# Patient Record
Sex: MALE | Race: WHITE | Employment: FULL TIME | URBAN - METROPOLITAN AREA
[De-identification: names, ages, dates, MRNs, and addresses within clinical notes are randomized per-mention and may not be internally consistent; named-entity substitution may affect disease eponyms.]

---

## 2017-12-06 ENCOUNTER — HOSPITAL ENCOUNTER (EMERGENCY)
Age: 57
Discharge: HOME | End: 2017-12-06
Payer: COMMERCIAL

## 2017-12-06 VITALS
OXYGEN SATURATION: 100 % | HEIGHT: 67 IN | WEIGHT: 195 LBS | BODY MASS INDEX: 30.61 KG/M2 | SYSTOLIC BLOOD PRESSURE: 124 MMHG | RESPIRATION RATE: 18 BRPM | HEART RATE: 68 BPM | DIASTOLIC BLOOD PRESSURE: 68 MMHG | TEMPERATURE: 97.8 F

## 2017-12-06 DIAGNOSIS — F12.90 MARIJUANA USE: Primary | ICD-10-CM

## 2017-12-06 PROCEDURE — 99283 EMERGENCY DEPT VISIT LOW MDM: CPT

## 2017-12-06 PROCEDURE — 96372 THER/PROPH/DIAG INJ SC/IM: CPT

## 2017-12-06 PROCEDURE — 96374 THER/PROPH/DIAG INJ IV PUSH: CPT

## 2017-12-06 PROCEDURE — 6360000002 HC RX W HCPCS: Performed by: PERSONAL EMERGENCY RESPONSE ATTENDANT

## 2017-12-06 RX ORDER — PROMETHAZINE HYDROCHLORIDE 25 MG/ML
25 INJECTION, SOLUTION INTRAMUSCULAR; INTRAVENOUS ONCE
Status: COMPLETED | OUTPATIENT
Start: 2017-12-06 | End: 2017-12-06

## 2017-12-06 RX ORDER — ONDANSETRON 2 MG/ML
4 INJECTION INTRAMUSCULAR; INTRAVENOUS ONCE
Status: COMPLETED | OUTPATIENT
Start: 2017-12-06 | End: 2017-12-06

## 2017-12-06 RX ADMIN — ONDANSETRON 4 MG: 2 INJECTION INTRAMUSCULAR; INTRAVENOUS at 19:50

## 2017-12-06 RX ADMIN — PROMETHAZINE HYDROCHLORIDE 25 MG: 25 INJECTION, SOLUTION INTRAMUSCULAR; INTRAVENOUS at 21:18

## 2017-12-06 ASSESSMENT — ENCOUNTER SYMPTOMS
DIARRHEA: 0
COLOR CHANGE: 0
BLOOD IN STOOL: 0
NAUSEA: 1
RHINORRHEA: 0
ABDOMINAL PAIN: 0
VOMITING: 1
SHORTNESS OF BREATH: 0
COUGH: 0

## 2017-12-07 NOTE — ED PROVIDER NOTES
[address] [facility] ED  eMERGENCY dEPARTMENT eNCOUnter      Pt Name: Kelly Strange  MRN: [id number]  [place] 1960  Date of evaluation: 12/6/2017  Provider: NUVIA Cantor      HISTORY OF PRESENT ILLNESS    Kelly Strange is a 59 y.o. male presents to the emergency department with nausea and vomiting after marijuana edible ingestion. Patient states at 5:30 PM today he was offered a marijuana brownie. He has smoked marijuana in the past but has never done an edible. He states he ate the whole thing and then started to feel nauseous and vomited 4 times. He does feel high in the room. He denies fevers, chest pain, short of breath, diarrhea, urinary complaints. Does have generalized weakness. She complains of being tired in the room. HPI    Nursing Notes were reviewed. REVIEW OF SYSTEMS       Review of Systems   Constitutional: Negative for appetite change and fever. HENT: Negative for congestion, drooling and rhinorrhea. Respiratory: Negative for cough and shortness of breath. Cardiovascular: Negative for chest pain. Gastrointestinal: Positive for nausea and vomiting. Negative for abdominal pain, blood in stool and diarrhea. Genitourinary: Negative for difficulty urinating. Musculoskeletal: Negative for neck stiffness. Skin: Negative for color change and rash. Neurological: Negative for dizziness, syncope, light-headedness, numbness and headaches. PAST MEDICAL HISTORY   History reviewed. No pertinent past medical history. SURGICAL HISTORY     History reviewed. No pertinent surgical history. CURRENT MEDICATIONS       Previous Medications    No medications on file       ALLERGIES     Review of patient's allergies indicates no known allergies. FAMILY HISTORY     History reviewed. No pertinent family history.        SOCIAL HISTORY       Social History     Social History    Marital status:      Spouse name: N/A    Number of children: N/A    Years of by the emergency physician with the below findings:    Interpretation per the Radiologist below, if available at the time of this note:    No orders to display           LABS:  Labs Reviewed - No data to display    All other labs were within normal range or not returned as of this dictation. EMERGENCY DEPARTMENT COURSE and DIFFERENTIAL DIAGNOSIS/MDM:   Vitals:    Vitals:    12/06/17 1925 12/06/17 1926   BP:  (!) 161/88   Pulse:  80   Resp:  20   Temp:  98.1 °F (36.7 °C)   SpO2:  97%   Weight: 195 lb (88.5 kg)    Height: 5' 7\" (1.702 m)          MDM    Patient presents with vomiting after marijuana brownie ingestion. Vital signs are not concerning at this time. There is no evidence of hypovolemia. I do not feel blood work at this time is needed. Patient is laying on reassessment comfortably in the cart no longer with nausea or emesis. Vital signs are not concerning. Daughter is at bedside. Standard anticipatory guidance given to patient upon discharge. Have given them a specific time frame in which to follow-up and who to follow-up with. I have also advised them that they should return to the emergency department if they get worse, or not getting better or develop any new or concerning symptoms. Patient demonstrates understanding. CRITICAL CARE TIME   Total Critical Care time was 0 minutes, excluding separately reportable procedures. There was a high probability of clinically significant/life threatening deterioration in the patient's condition which required my urgent intervention. Procedures    FINAL IMPRESSION      1.  Marijuana use          DISPOSITION/PLAN   DISPOSITION Discharge - Pending Orders Complete    PATIENT REFERRED TO:  [address] [place] [address]  [phone]  In 2 days        DISCHARGE MEDICATIONS:  New Prescriptions    No medications on file          (Please note that portions of this note were completed with a voice recognition program. Efforts were made to edit the dictations but occasionally words are mis-transcribed. )    NUVIA Hooper (electronically signed)  Emergency Physician [address] [place] [address]  12/06/17 8596

## 2017-12-07 NOTE — ED NOTES
While patient being discharge patient had x1 episode of emesis. Indra PEDERSEN notified.       Rosendo Ndiaye RN  12/06/17 [id number]

## 2022-04-04 ENCOUNTER — HOSPITAL ENCOUNTER (INPATIENT)
Age: 62
LOS: 3 days | Discharge: HOME | DRG: 885 | End: 2022-04-07
Attending: STUDENT IN AN ORGANIZED HEALTH CARE EDUCATION/TRAINING PROGRAM | Admitting: PSYCHIATRY & NEUROLOGY
Payer: COMMERCIAL

## 2022-04-04 DIAGNOSIS — R45.851 SUICIDAL IDEATION: Primary | ICD-10-CM

## 2022-04-04 PROBLEM — F32.2 MAJOR DEPRESSIVE DISORDER, SEVERE (HCC): Status: ACTIVE | Noted: 2022-04-04

## 2022-04-04 LAB
ACETAMINOPHEN LEVEL: <5 UG/ML (ref 10–30)
ALBUMIN SERPL-MCNC: 4.3 G/DL (ref 3.5–4.6)
ALP BLD-CCNC: 70 U/L (ref 35–104)
ALT SERPL-CCNC: 18 U/L (ref 0–41)
AMPHETAMINE SCREEN, URINE: ABNORMAL
ANION GAP SERPL CALCULATED.3IONS-SCNC: 10 MEQ/L (ref 9–15)
AST SERPL-CCNC: 20 U/L (ref 0–40)
BARBITURATE SCREEN URINE: ABNORMAL
BASOPHILS ABSOLUTE: 0.1 K/UL (ref 0–0.2)
BASOPHILS RELATIVE PERCENT: 0.6 %
BENZODIAZEPINE SCREEN, URINE: ABNORMAL
BILIRUB SERPL-MCNC: 0.5 MG/DL (ref 0.2–0.7)
BILIRUBIN URINE: NEGATIVE
BLOOD, URINE: NEGATIVE
BUN BLDV-MCNC: 19 MG/DL (ref 8–23)
CALCIUM SERPL-MCNC: 8.8 MG/DL (ref 8.5–9.9)
CANNABINOID SCREEN URINE: POSITIVE
CHLORIDE BLD-SCNC: 103 MEQ/L (ref 95–107)
CHOLESTEROL, TOTAL: 204 MG/DL (ref 0–199)
CLARITY: CLEAR
CO2: 24 MEQ/L (ref 20–31)
COCAINE METABOLITE SCREEN URINE: ABNORMAL
COLOR: YELLOW
CREAT SERPL-MCNC: 0.91 MG/DL (ref 0.7–1.2)
EOSINOPHILS ABSOLUTE: 0.1 K/UL (ref 0–0.7)
EOSINOPHILS RELATIVE PERCENT: 1.3 %
ETHANOL PERCENT: NORMAL G/DL
ETHANOL: <10 MG/DL (ref 0–0.08)
GFR AFRICAN AMERICAN: >60
GFR NON-AFRICAN AMERICAN: >60
GLOBULIN: 3.1 G/DL (ref 2.3–3.5)
GLUCOSE BLD-MCNC: 110 MG/DL (ref 70–99)
GLUCOSE URINE: NEGATIVE MG/DL
HCT VFR BLD CALC: 43.7 % (ref 42–52)
HDLC SERPL-MCNC: 60 MG/DL (ref 40–59)
HEMOGLOBIN: 14.6 G/DL (ref 14–18)
KETONES, URINE: NEGATIVE MG/DL
LDL CHOLESTEROL CALCULATED: 115 MG/DL (ref 0–129)
LEUKOCYTE ESTERASE, URINE: NEGATIVE
LYMPHOCYTES ABSOLUTE: 1.8 K/UL (ref 1–4.8)
LYMPHOCYTES RELATIVE PERCENT: 21 %
Lab: ABNORMAL
MCH RBC QN AUTO: 30 PG (ref 27–31.3)
MCHC RBC AUTO-ENTMCNC: 33.3 % (ref 33–37)
MCV RBC AUTO: 89.9 FL (ref 80–100)
METHADONE SCREEN, URINE: ABNORMAL
MONOCYTES ABSOLUTE: 0.9 K/UL (ref 0.2–0.8)
MONOCYTES RELATIVE PERCENT: 10.9 %
NEUTROPHILS ABSOLUTE: 5.6 K/UL (ref 1.4–6.5)
NEUTROPHILS RELATIVE PERCENT: 66.2 %
NITRITE, URINE: NEGATIVE
OPIATE SCREEN URINE: ABNORMAL
OXYCODONE URINE: ABNORMAL
PDW BLD-RTO: 14.5 % (ref 11.5–14.5)
PH UA: 6.5 (ref 5–9)
PHENCYCLIDINE SCREEN URINE: ABNORMAL
PLATELET # BLD: 214 K/UL (ref 130–400)
POTASSIUM SERPL-SCNC: 4 MEQ/L (ref 3.4–4.9)
PROPOXYPHENE SCREEN: ABNORMAL
PROTEIN UA: NEGATIVE MG/DL
RBC # BLD: 4.86 M/UL (ref 4.7–6.1)
SALICYLATE, SERUM: <0.3 MG/DL (ref 15–30)
SARS-COV-2, NAAT: NOT DETECTED
SODIUM BLD-SCNC: 137 MEQ/L (ref 135–144)
SPECIFIC GRAVITY UA: 1.02 (ref 1–1.03)
TOTAL CK: 208 U/L (ref 0–190)
TOTAL PROTEIN: 7.4 G/DL (ref 6.3–8)
TRIGL SERPL-MCNC: 147 MG/DL (ref 0–150)
TSH SERPL DL<=0.05 MIU/L-ACNC: 4.41 UIU/ML (ref 0.44–3.86)
URINE REFLEX TO CULTURE: NORMAL
UROBILINOGEN, URINE: 0.2 E.U./DL
WBC # BLD: 8.5 K/UL (ref 4.8–10.8)

## 2022-04-04 PROCEDURE — 87635 SARS-COV-2 COVID-19 AMP PRB: CPT

## 2022-04-04 PROCEDURE — 81003 URINALYSIS AUTO W/O SCOPE: CPT

## 2022-04-04 PROCEDURE — 6370000000 HC RX 637 (ALT 250 FOR IP): Performed by: STUDENT IN AN ORGANIZED HEALTH CARE EDUCATION/TRAINING PROGRAM

## 2022-04-04 PROCEDURE — 80307 DRUG TEST PRSMV CHEM ANLYZR: CPT

## 2022-04-04 PROCEDURE — 80143 DRUG ASSAY ACETAMINOPHEN: CPT

## 2022-04-04 PROCEDURE — 1240000000 HC EMOTIONAL WELLNESS R&B

## 2022-04-04 PROCEDURE — 6360000002 HC RX W HCPCS: Performed by: STUDENT IN AN ORGANIZED HEALTH CARE EDUCATION/TRAINING PROGRAM

## 2022-04-04 PROCEDURE — 82077 ASSAY SPEC XCP UR&BREATH IA: CPT

## 2022-04-04 PROCEDURE — 99285 EMERGENCY DEPT VISIT HI MDM: CPT

## 2022-04-04 PROCEDURE — 80053 COMPREHEN METABOLIC PANEL: CPT

## 2022-04-04 PROCEDURE — 85025 COMPLETE CBC W/AUTO DIFF WBC: CPT

## 2022-04-04 PROCEDURE — 80061 LIPID PANEL: CPT

## 2022-04-04 PROCEDURE — 84443 ASSAY THYROID STIM HORMONE: CPT

## 2022-04-04 PROCEDURE — 96372 THER/PROPH/DIAG INJ SC/IM: CPT

## 2022-04-04 PROCEDURE — 82550 ASSAY OF CK (CPK): CPT

## 2022-04-04 PROCEDURE — 36415 COLL VENOUS BLD VENIPUNCTURE: CPT

## 2022-04-04 PROCEDURE — 80179 DRUG ASSAY SALICYLATE: CPT

## 2022-04-04 RX ORDER — HYDROXYZINE PAMOATE 50 MG/1
50 CAPSULE ORAL EVERY 6 HOURS PRN
Status: DISCONTINUED | OUTPATIENT
Start: 2022-04-04 | End: 2022-04-07 | Stop reason: HOSPADM

## 2022-04-04 RX ORDER — BENZTROPINE MESYLATE 1 MG/ML
2 INJECTION INTRAMUSCULAR; INTRAVENOUS 2 TIMES DAILY PRN
Status: DISCONTINUED | OUTPATIENT
Start: 2022-04-04 | End: 2022-04-07 | Stop reason: HOSPADM

## 2022-04-04 RX ORDER — HYDROXYZINE HYDROCHLORIDE 50 MG/ML
50 INJECTION, SOLUTION INTRAMUSCULAR EVERY 6 HOURS PRN
Status: DISCONTINUED | OUTPATIENT
Start: 2022-04-04 | End: 2022-04-07 | Stop reason: HOSPADM

## 2022-04-04 RX ORDER — ACETAMINOPHEN 325 MG/1
650 TABLET ORAL EVERY 4 HOURS PRN
Status: DISCONTINUED | OUTPATIENT
Start: 2022-04-04 | End: 2022-04-07 | Stop reason: HOSPADM

## 2022-04-04 RX ORDER — HYDRALAZINE HYDROCHLORIDE 20 MG/ML
10 INJECTION INTRAMUSCULAR; INTRAVENOUS ONCE
Status: COMPLETED | OUTPATIENT
Start: 2022-04-04 | End: 2022-04-04

## 2022-04-04 RX ORDER — HALOPERIDOL 5 MG/ML
5 INJECTION INTRAMUSCULAR EVERY 6 HOURS PRN
Status: DISCONTINUED | OUTPATIENT
Start: 2022-04-04 | End: 2022-04-07 | Stop reason: HOSPADM

## 2022-04-04 RX ORDER — MAGNESIUM HYDROXIDE/ALUMINUM HYDROXICE/SIMETHICONE 120; 1200; 1200 MG/30ML; MG/30ML; MG/30ML
30 SUSPENSION ORAL PRN
Status: DISCONTINUED | OUTPATIENT
Start: 2022-04-04 | End: 2022-04-07 | Stop reason: HOSPADM

## 2022-04-04 RX ORDER — HALOPERIDOL 5 MG
5 TABLET ORAL EVERY 6 HOURS PRN
Status: DISCONTINUED | OUTPATIENT
Start: 2022-04-04 | End: 2022-04-07 | Stop reason: HOSPADM

## 2022-04-04 RX ORDER — AMLODIPINE BESYLATE 5 MG/1
5 TABLET ORAL ONCE
Status: COMPLETED | OUTPATIENT
Start: 2022-04-04 | End: 2022-04-04

## 2022-04-04 RX ORDER — TRAZODONE HYDROCHLORIDE 50 MG/1
50 TABLET ORAL NIGHTLY PRN
Status: DISCONTINUED | OUTPATIENT
Start: 2022-04-04 | End: 2022-04-07 | Stop reason: HOSPADM

## 2022-04-04 RX ADMIN — HYDRALAZINE HYDROCHLORIDE 10 MG: 20 INJECTION INTRAMUSCULAR; INTRAVENOUS at 15:52

## 2022-04-04 RX ADMIN — AMLODIPINE BESYLATE 5 MG: 5 TABLET ORAL at 14:42

## 2022-04-04 ASSESSMENT — SLEEP AND FATIGUE QUESTIONNAIRES
RESTFUL SLEEP: NO
DIFFICULTY FALLING ASLEEP: YES
SLEEP PATTERN: DIFFICULTY FALLING ASLEEP;RESTLESSNESS;DISTURBED/INTERRUPTED SLEEP;INSOMNIA
DIFFICULTY STAYING ASLEEP: YES
DIFFICULTY ARISING: YES
DO YOU USE A SLEEP AID: NO
DO YOU HAVE DIFFICULTY SLEEPING: YES

## 2022-04-04 ASSESSMENT — PATIENT HEALTH QUESTIONNAIRE - PHQ9: SUM OF ALL RESPONSES TO PHQ QUESTIONS 1-9: 5

## 2022-04-04 ASSESSMENT — ENCOUNTER SYMPTOMS
SHORTNESS OF BREATH: 0
BACK PAIN: 0
SORE THROAT: 0
CHEST TIGHTNESS: 0
EYE PAIN: 0
NAUSEA: 0
DIARRHEA: 0
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
## 2022-04-04 NOTE — ED NOTES
Awaiting call from [address] [place] [address] for bed assignment and report. Brayan Lopez  04/04/22 [id number]

## 2022-04-04 NOTE — PROGRESS NOTES
Pt admission completed in pt room. Pt states that his biggest cause of depression is that he is going through a divorce with his wife. Pt reports that his daughter, who has been on the unit before is bipolar and states that she is also a cause of stress in his life due to the chaos she causes within his family. Pt states he drinks and smokes marijuana to calm down and escape the chaos of his life. Pt states, \"I feel empty. I have no focus. And earlier I was thinking I mine as well be dead because I am dead inside. \" Pt reports he has about 20 alcoholic beverages/week. Pt states he has been sleeping on his sister's couch since his separation. Pt has a family history of SI. Pt's grandfather  by suicide and pt's mother attempted suicide. Pt rates depression 2/10 and anxiety 0/10. Pt denies current SI, HI, AVH. Pt does state \"I was smoking a joint the other day in the dark and when I turned my head really fast I saw a laser but I don't think that was a hallucination. \" Pt is receptive to getting help and is interested in therapy.

## 2022-04-04 NOTE — ED NOTES
Per 3w charge nurse, unable to take patient until systolic under 083. ED provider updated.      Luli Fierro, SWATI  04/04/22 7802
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
## 2022-04-04 NOTE — ED TRIAGE NOTES
Patient reports feeling increasingly depressed and actively suicidal with various plans including thoughts to shoot himself. He denies having intent to act on any plan at this time and state he does not have access to weapons. He states he will be safe while here, but if he was to leave the hospital he will end his life. He reports stressor including going through a divorce, being homeless now that he can no longer live with his sister. He is brief and irritable during our encounter. He denies having homicidal thoughts. He was adherent with specimen collections. Patient gathered phone numbers from his phone. He was instructed how to call for staff. He verbalizes understanding. He was offered food and beverage but declined. He denies having concerns at this time. Safety continues to be monitored.

## 2022-04-04 NOTE — ED PROVIDER NOTES
[address] [facility] ED  eMERGENCYdEPARTMENT eNCOUnter      Pt Name: Gera Kessler  MRN: [id number]  [place] 1960  Date of evaluation: 4/4/2022  Provider:Zay Rivas PA-C    CHIEF COMPLAINT           HPI  Gera Kessler is a 59 y.o. male per chart review has a h/o hypertension presenting with suicidal ideation. Patient reports gradual onset, worsening, pervasive thoughts of self-harm including thoughts to shoot himself. Patient reports recent home stressors including divorce, fighting with mother, homelessness. Patient states he is in\" a rough spot mentally\". Although patient denies plan of suicide to me on exam, triage note reports he has various plans and thoughts of shooting himself. ROS  Review of Systems   Constitutional: Negative for chills, fatigue and fever. HENT: Negative for ear pain, hearing loss and sore throat. Eyes: Negative for pain and visual disturbance. Respiratory: Negative for chest tightness and shortness of breath. Cardiovascular: Negative for chest pain. Gastrointestinal: Negative for diarrhea and nausea. Endocrine: Negative for cold intolerance. Genitourinary: Negative for hematuria. Musculoskeletal: Negative for back pain. Skin: Negative for rash and wound. Neurological: Negative for dizziness and headaches. Psychiatric/Behavioral: Positive for self-injury and suicidal ideas. Negative for behavioral problems and confusion. Except as noted above the remainder of the review of systems was reviewed and negative. PAST MEDICAL HISTORY     Past Medical History:   Diagnosis Date    Hypertension          SURGICAL HISTORY       Past Surgical History:   Procedure Laterality Date    LIPOMA RESECTION           CURRENTMEDICATIONS       Previous Medications    No medications on file       ALLERGIES     Patient has no known allergies. FAMILY HISTORY     History reviewed. No pertinent family history.        SOCIAL HISTORY       Social History Socioeconomic History    Marital status:      Spouse name: None    Number of children: None    Years of education: None    Highest education level: None   Occupational History    None   Tobacco Use    Smoking status: Never Smoker    Smokeless tobacco: Never Used   Substance and Sexual Activity    Alcohol use: Yes    Drug use: Yes     Types: Marijuana Belen Ing)    Sexual activity: None   Other Topics Concern    None   Social History Narrative    None     Social Determinants of Health     Financial Resource Strain:     Difficulty of Paying Living Expenses: Not on file   Food Insecurity:     Worried About Running Out of Food in the Last Year: Not on file    Nora of Food in the Last Year: Not on file   Transportation Needs:     Lack of Transportation (Medical): Not on file    Lack of Transportation (Non-Medical):  Not on file   Physical Activity:     Days of Exercise per Week: Not on file    Minutes of Exercise per Session: Not on file   Stress:     Feeling of Stress : Not on file   Social Connections:     Frequency of Communication with Friends and Family: Not on file    Frequency of Social Gatherings with Friends and Family: Not on file    Attends Yazidi Services: Not on file    Active Member of [address] [place] [address] or Organizations: Not on file    Attends Club or Organization Meetings: Not on file    Marital Status: Not on file   Intimate Partner Violence:     Fear of Current or Ex-Partner: Not on file    Emotionally Abused: Not on file    Physically Abused: Not on file    Sexually Abused: Not on file   Housing Stability:     Unable to Pay for Housing in the Last Year: Not on file    Number of [place] in the Last Year: Not on file    Unstable Housing in the Last Year: Not on file         PHYSICAL EXAM       ED Triage Vitals [04/04/22 0907]   BP Temp Temp src Pulse Resp SpO2 Height Weight   (!) 162/77 97.3 °F (36.3 °C) -- 77 18 99 % 5' 7\" (1.702 m) 180 lb (81.6 kg)       Physical Exam  Constitutional:       Appearance: Normal appearance. HENT:      Head: Normocephalic and atraumatic. Nose: Nose normal.      Mouth/Throat:      Mouth: Mucous membranes are moist.      Pharynx: No oropharyngeal exudate or posterior oropharyngeal erythema. Eyes:      Extraocular Movements: Extraocular movements intact. Conjunctiva/sclera: Conjunctivae normal.      Pupils: Pupils are equal, round, and reactive to light. Cardiovascular:      Rate and Rhythm: Normal rate and regular rhythm. Heart sounds: Normal heart sounds. Pulmonary:      Effort: Pulmonary effort is normal.      Breath sounds: Normal breath sounds. No wheezing or rhonchi. Abdominal:      General: Bowel sounds are normal.      Palpations: Abdomen is soft. Tenderness: There is no abdominal tenderness. There is no guarding. Musculoskeletal:         General: No deformity. Normal range of motion. Cervical back: Normal range of motion and neck supple. Skin:     General: Skin is warm and dry. Coloration: Skin is not jaundiced. Neurological:      General: No focal deficit present. Mental Status: He is alert and oriented to person, place, and time. Psychiatric:         Attention and Perception: Attention and perception normal.         Mood and Affect: Mood normal.         Speech: Speech normal.         Behavior: Behavior normal.         Thought Content: Thought content is not delusional. Thought content includes suicidal ideation. Thought content does not include homicidal or suicidal plan. Cognition and Memory: Cognition and memory normal.         Judgment: Judgment normal.           MDM  This is a 68-year-old male presenting with thoughts of self-harm. Patient is afebrile hemodynamically stable. Pt given PO Norvasc, IM Hydralazine. Pt cleared medically to the floor. FINAL IMPRESSION      1.  Suicidal ideation          DISPOSITION/PLAN   DISPOSITION Decision To Admit 04/04/2022

## 2022-04-04 NOTE — ED NOTES
[facility] requesting blood pressure medication be given and b/p under better control before coming to floor. [place] Vivienne Rasheed  04/04/22 0450

## 2022-04-05 LAB
EKG ATRIAL RATE: 82 BPM
EKG P AXIS: 32 DEGREES
EKG P-R INTERVAL: 130 MS
EKG Q-T INTERVAL: 380 MS
EKG QRS DURATION: 96 MS
EKG QTC CALCULATION (BAZETT): 443 MS
EKG R AXIS: -19 DEGREES
EKG T AXIS: 38 DEGREES
EKG VENTRICULAR RATE: 82 BPM

## 2022-04-05 PROCEDURE — 93010 ELECTROCARDIOGRAM REPORT: CPT | Performed by: INTERNAL MEDICINE

## 2022-04-05 PROCEDURE — 93005 ELECTROCARDIOGRAM TRACING: CPT | Performed by: PSYCHIATRY & NEUROLOGY

## 2022-04-05 PROCEDURE — 1240000000 HC EMOTIONAL WELLNESS R&B

## 2022-04-05 PROCEDURE — 6370000000 HC RX 637 (ALT 250 FOR IP): Performed by: PSYCHIATRY & NEUROLOGY

## 2022-04-05 PROCEDURE — 99223 1ST HOSP IP/OBS HIGH 75: CPT | Performed by: PSYCHIATRY & NEUROLOGY

## 2022-04-05 RX ORDER — MIRTAZAPINE 15 MG/1
15 TABLET, FILM COATED ORAL NIGHTLY
Status: DISCONTINUED | OUTPATIENT
Start: 2022-04-05 | End: 2022-04-07 | Stop reason: HOSPADM

## 2022-04-05 RX ADMIN — MIRTAZAPINE 15 MG: 15 TABLET, FILM COATED ORAL at 20:59

## 2022-04-05 ASSESSMENT — LIFESTYLE VARIABLES: HISTORY_ALCOHOL_USE: NO

## 2022-04-05 NOTE — FLOWSHEET NOTE
Pt is visible out on the unit watching TV with peers in the day area. Pt presents bright and cooperative and pleasant. pt denies feeling depressed or anxious and endorses good sleep and appetite. Pt does attend groups and states \" I feel pretty good today\". Pt denies SI,HI,AVH.

## 2022-04-05 NOTE — PROGRESS NOTES
Morning Community Meeting Topics    Janneth Fertile attended the morning community meeting on 4/5/22. Topics discussed today     [x] Introduction   Day of the week and date   Mask distribution   Current mask requirements  [x]Teams   Explanation of  Green and Blue team criteria   Nurses assigned to each team for today   Explanation about green and blue paper  o Date  o Patient's Name  o Patient's Nurse  o Goals  [x] Visitation   Announce the visiting hours for the day   Announce which team is allowed to have visitors for the day   Review any updated Covid 19 requirements for visitors during visitation  o Vaccine Card or negative Covid test within 48 hours of visit  o State Identification   Patients are reminded to alert the  at least 1 hour before visitation   [x] Unit Orientation   Coffee use   Phone location and etiquette   Shower locations  [employer] and dryer location and process   Common area expectations   Staff rounds expectation  [x] Meals    Educate patient to the menu  o The patient is encouraged to fill out the menu to get preferences at mealtime  o The patient is educated that if they do not fill out the menu, they will get the standard tray  o The coffee pot is decaf, patient encouraged to order regular coffee from menu.    Educate patient to the meal process   Patient encouraged to eat snacks provided twice daily  o Snacks may stay in patient room     [x] Discharge Process   Discharge expectations   Fill out the survey after discharge   [x] Hygiene   Daily showers encouraged  o Showers availability discussed    Daily dressing encouraged  o Discussed wearing street clothing   Education provided on where to place linens and clothing  o Linens in the hamper  o personal clothing does not go into the linen hamper  [x] Group    Patient encouraged to attend group provided   Time of Group Meetings discussed   Gentle reminder that attendance is a Physician order  [x] Movement   Chair exercises completed   Stretching completed  Notes:Goal - \"To go home\" Electronically signed by ERWIN Cheung on 4/5/2022 at 9:59 AM

## 2022-04-05 NOTE — GROUP NOTE
Group Therapy Note    Date: 4/5/2022    Group Start Time: 1600  Group End Time: 36  Group Topic: Healthy Living/Wellness    [facility] 3W I    Chadwick Henderson RN        Group Therapy Note    Attendees: 9/18         Patient's Goal:  Learning about community involvement and how to give back to the community.       Status After Intervention:  Improved    Participation Level: Interactive    Participation Quality: Appropriate      Speech:  normal      Thought Process/Content: Logical      Affective Functioning: Congruent      Mood: euthymic      Level of consciousness:  Oriented x4      Response to Learning: Able to verbalize current knowledge/experience      Endings: None Reported    Modes of Intervention: Education      Discipline Responsible: Registered Nurse      Signature:  Chadwick Henderson RN

## 2022-04-05 NOTE — GROUP NOTE
Group Therapy Note    Date: 4/5/2022    Group Start Time: 1110  Group End Time: 5630  Group Topic: Psychotherapy    [facility]    Dolores Capellan, [facility]        Group Therapy Note    Attendees:11         Patient's Goal:  To  Learn resources    Notes:  Patient stated that he wants to be connected to outpt    Status After Intervention:  Unchanged    Participation Level: Minimal    Participation Quality: Attentive      Speech:  normal      Thought Process/Content: Logical      Affective Functioning: Congruent      Mood: anxious      Level of consciousness:  Alert      Response to Learning: Progressing to goal      Endings: None Reported    Modes of Intervention: Support      Discipline Responsible: /Counselor      Signature:  Catia Snider, [facility]

## 2022-04-05 NOTE — GROUP NOTE
Group Therapy Note    Date: 4/5/2022    Group Start Time: 1000  Group End Time: 1050  Group Topic: Psychoeducation    [facility]    Regan Saha        Group Therapy Note    Attendees: 7         Patient's Goal:  \"To go home\"    Notes:  Patient attended the 1000 skills group. Patient was attentive, calm and work well on his task in group.     Status After Intervention:  Unchanged    Participation Level: Good    Participation Quality: Appropriate and Attentive      Speech:  normal      Thought Process/Content: Linear      Affective Functioning: Congruent      Mood: calm      Level of consciousness:  Alert and Oriented x4      Response to Learning: Progressing to goal      Endings: None Reported    Modes of Intervention: Education, Socialization and Activity      Discipline Responsible: Psychoeducational Specialist      Signature:  Rgean Saha

## 2022-04-05 NOTE — GROUP NOTE
Group Therapy Note    Date: 4/5/2022    Group Start Time: 1300  Group End Time: 2590  Group Topic: Healthy Living/Wellness    [facility]    Elliot Hamlin RN        Group Therapy Note    Attendees: 7     Patient's Goal:  To learn more about setting goals and staying motivated to achieve them. Notes:  Pt actively and appropriately participated      Status After Intervention:  Unchanged    Participation Level:  Active Listener and Interactive    Participation Quality: Appropriate, Attentive, Sharing and Supportive      Speech:  normal      Thought Process/Content: Logical  Linear      Affective Functioning: Congruent      Mood: euthymic      Level of consciousness:  Alert and Oriented x4      Response to Learning: Able to verbalize current knowledge/experience, Able to verbalize/acknowledge new learning and Able to retain information      Endings: None Reported    Modes of Intervention: Education, Support, Socialization and Exploration      Discipline Responsible: Registered Nurse      Signature:  Elliot Hamlin RN

## 2022-04-05 NOTE — GROUP NOTE
Group Therapy Note    Date: 4/4/2022    Group Start Time: 1630  Group End Time: 1700  Group Topic: Healthy Living/Wellness    [facility] 3W DIPTI Smith        Group Therapy Note    Attendees: 11/17         Patient's Goal:  To learn about the benefits of progressive muscle relaxation. Notes:  Patient participated in group.      Status After Intervention:  Unchanged    Participation Level: Interactive    Participation Quality: Appropriate and Attentive      Speech:  normal      Thought Process/Content: Logical      Affective Functioning: Congruent      Mood: euthymic      Level of consciousness:  Alert and Attentive      Response to Learning: Progressing to goal      Endings: None Reported    Modes of Intervention: Education      Discipline Responsible: [address], [employer]      Signature:  Bianca Smith

## 2022-04-05 NOTE — PROGRESS NOTES
Behavioral Services  Medicare Certification Upon Admission    I certify that this patient's inpatient psychiatric hospital admission is medically necessary for:    [x] (1) Treatment which could reasonably be expected to improve this patient's condition,       [x] (2) Or for diagnostic study;     AND     [x](2) The inpatient psychiatric services are provided while the individual is under the care of a physician and are included in the individualized plan of care.     Estimated length of stay/service 3-5 days    Plan for post-hospital care OP care    Electronically signed by Valencia Fountain MD on 4/5/2022 at 9:48 AM

## 2022-04-05 NOTE — GROUP NOTE
Group Therapy Note    Date: 4/4/2022    Group Start Time: 1930  Group End Time: 2000  Group Topic: Recreational    [facility] 3W I    Chavo Triplett        Group Therapy Note    Attendees: 11/17         Patient's Goal:  To play Heads Up with the group. Notes:  Patient played the game with peers.      Status After Intervention:  Unchanged    Participation Level: Interactive    Participation Quality: Appropriate and Attentive      Speech:  normal      Thought Process/Content: Logical      Affective Functioning: Congruent      Mood: euthymic      Level of consciousness:  Alert and Attentive      Response to Learning: Progressing to goal      Endings: None Reported    Modes of Intervention: Activity      Discipline Responsible: [address], [employer] [address] [employer]      Signature:  Chavo Triplett

## 2022-04-05 NOTE — CARE COORDINATION
Brief Intervention and Referral to Treatment Summary    Patient was provided PHQ-9, AUDIT and DAST Screening:      PHQ-9 Score: 5  AUDIT Score:  0 (reports 2-3 beers a day but may be minimizing)  DAST Score:  0    Patients substance use is considered     Low Risk/Healthy  Moderate Risk ? Harmful  Dependent    Patients depression is considered:     Minimal  Mild X  Moderate  Moderately Severe  Severe    Brief Education Was Provided    Patient was receptive  Patient was not receptive X       Brief Intervention Is Provided (Only for AUDIT or DAST)     Patient reports readiness to decrease and/or stop use and a plan was discussed   Patient denies readiness to decrease and/or stop use and a plan was not discussed X       Recommendations/Referrals for Brief and/or Specialized Treatment Provided to Patient     Pt denied referral to [facility]. Reports he would rather be linked with [facility]. Pt reports only drinking 2-3 beers a day and smoking marijuana once a week. Appears he may be minimizing his use.  Electronically signed by MANNY Mckeon on 4/5/2022 at 3:22 PM

## 2022-04-05 NOTE — PROGRESS NOTES
Patient was talkative, had a flat affect, was worrisome but he was pleasant and cooperative. He was agreeable to do his leisure assessment. Patient stated he is depressed and stressed due to getting a divorce. He is also stressed due to the long drive to work, he is frustrated with his  and he has some family issues. He denies any auditory or visual hallucinations. He did feel suicidal without a plan. He is now living with his sister. He feels he has no support. He has poor sleep but his appetite is good. He stated he drinks alcohol occasionally and smokes marijuana every so often. He enjoys watching TV, listening to music and golfing in the summer.  Electronically signed by Courtney Thomas, [address] on 4/5/2022 at 8:57 AM

## 2022-04-05 NOTE — CARE COORDINATION
Psychosocial Assessment    Current Level of Psychosocial Functioning     Independent X  Dependent    Minimal Assist     Comments:  Resides with sister, going through a divorce, works full time as a . Psychosocial High Risk Factors (check all that apply)    Unable to obtain meds   Chronic illness/pain    Substance abuse ? Lack of Family Support   Financial stress   Isolation   Inadequate Community Resources X  Suicide attempt(s)  Not taking medications X  Victim of crime   Developmental Delay  Unable to manage personal needs    Age 70 or older   Homeless  No transportation   Readmission within 30 days  Unemployment  Traumatic Event    Family/Supports identified: Karey Pryor, sister, [phone]    Sexual Orientation:  Reports past heterosexual marriage. Patient Strengths: employment, housing, natural support    Patient Barriers:  Not linked with outpatient, potential alcoholism, going through divorce. Safety plan: completed     CMHC/MH history: denies past MH concerns. No past psych admissions or past [facility] services. Plan of Care:  medication management, group/individual therapies, family meetings, psycho -education, treatment team meetings to assist with stabilization    Initial Discharge Plan:  Gather collateral and discuss with tx team.     Clinical Summary:  Pt is a 59year old male who presented to ER due increase depression, suicidal thoughts, and recent stressors due to divorce. Divorce started last august and reports increased depression. Reports anxiety. Pt was hyperverbal at times during the assessment and hard to direct. Pt reports feeling worthless and hopeless. Has thought of many plans. Reports that he drinks 2-3 beers a day, pt may be minimizing. Pt needs to be linked with outpatient services upon discharge. Pt denies SI/HI/AVH.  Electronically signed by MANNY Mckeon on 4/5/2022 at 3:21 PM

## 2022-04-05 NOTE — CONSULTS
[facility]  MEDICINE    HISTORY AND PHYSICAL EXAM    PATIENT NAME:  Dot Oconnor    MRN:  [id number]  SERVICE DATE:  4/5/2022   SERVICE TIME:  9:07 AM    Primary Care Physician: Trina Villanueva MD         SUBJECTIVE  CHIEF COMPLAINT:  Medically appropriate for inpatient psychiatry admission. Consult for medical H/P encounter. HPI:  This is a 59 y.o. male who presents with PMHx HTN presented to emergency room with SI but no specific plan. Stressors include recent divorce. Admits to heavily drinking but not daily. Patient cleared from emergency room for psychiatric care. Patient Seen, Chart, Labs, Radiologystudies, and Consults reviewed. Patient denies headache, chest pain, shortness of breath, N/V/D/C, fever/chills. PAST MEDICAL HISTORY:    Past Medical History:   Diagnosis Date    Hypertension      PAST SURGICAL HISTORY:    Past Surgical History:   Procedure Laterality Date    LIPOMA RESECTION       FAMILY HISTORY:  History reviewed. No pertinent family history. SOCIAL HISTORY:    Social History     Socioeconomic History    Marital status:      Spouse name: Not on file    Number of children: Not on file    Years of education: Not on file    Highest education level: Not on file   Occupational History    Not on file   Tobacco Use    Smoking status: Never Smoker    Smokeless tobacco: Never Used   Substance and Sexual Activity    Alcohol use: Yes    Drug use: Yes     Types: Marijuana [employer])    Sexual activity: Not on file   Other Topics Concern    Not on file   Social History Narrative    Not on file     Social Determinants of Health     Financial Resource Strain:     Difficulty of Paying Living Expenses: Not on file   Food Insecurity:     Worried About Running Out of Food in the Last Year: Not on file    Nora of Food in the Last Year: Not on file   Transportation Needs:     Lack of Transportation (Medical):  Not on file    Lack of Transportation (Non-Medical): Not on file   Physical Activity:     Days of Exercise per Week: Not on file    Minutes of Exercise per Session: Not on file   Stress:     Feeling of Stress : Not on file   Social Connections:     Frequency of Communication with Friends and Family: Not on file    Frequency of Social Gatherings with Friends and Family: Not on file    Attends Jewish Services: Not on file    Active Member of Clubs or Organizations: Not on file    Attends Club or Organization Meetings: Not on file    Marital Status: Not on file   Intimate Partner Violence:     Fear of Current or Ex-Partner: Not on file    Emotionally Abused: Not on file    Physically Abused: Not on file    Sexually Abused: Not on file   Housing Stability:     Unable to Pay for Housing in the Last Year: Not on file    Number of Places Lived in the Last Year: Not on file    Unstable Housing in the Last Year: Not on file     MEDICATIONS:    Current Facility-Administered Medications   Medication Dose Route Frequency Provider Last Rate Last Admin    acetaminophen (TYLENOL) tablet 650 mg  650 mg Oral Q4H PRN Gabriela Champion MD        magnesium hydroxide (MILK OF MAGNESIA) 400 MG/5ML suspension 30 mL  30 mL Oral Daily PRN Gabriela Champion MD        aluminum & magnesium hydroxide-simethicone (MAALOX) 200-200-20 MG/5ML suspension 30 mL  30 mL Oral PRN Gabriela Champion MD        haloperidol (HALDOL) tablet 5 mg  5 mg Oral Q6H PRN Gabriela Champion MD        Or    haloperidol lactate (HALDOL) injection 5 mg  5 mg IntraMUSCular Q6H PRN Gabriela Champion MD        benztropine mesylate (COGENTIN) injection 2 mg  2 mg IntraMUSCular BID PRN Gabriela Champion MD        hydrOXYzine (VISTARIL) capsule 50 mg  50 mg Oral Q6H PRN Gabriela Champion MD        Or    hydrOXYzine (VISTARIL) injection 50 mg  50 mg IntraMUSCular Q6H PRN Gabriela Champion MD        traZODone (DESYREL) tablet 50 mg  50 mg Oral Nightly PRN Gabriela Champion MD           ALLERGIES: Patient has no known allergies. REVIEW OF SYSTEM:   ROS as noted in HPI, 12 point ROS reviewed and otherwise negative. OBJECTIVE  PHYSICAL EXAM: /85   Pulse 85   Temp 98.3 °F (36.8 °C) (Oral)   Resp 18   Ht 5' 7\" (1.702 m)   Wt 180 lb (81.6 kg)   SpO2 98%   BMI 28.19 kg/m²   CONSTITUTIONAL:  awake, alert, cooperative, no apparent distress, and appears stated age  EYES:  Lids and lashes normal, pupils equal, round and reactive to light, extra ocular muscles intact, sclera clear, conjunctiva normal  ENT:  Normocephalic, without obvious abnormality, atraumatic, sinuses nontender on palpation, external ears without lesions, oral pharynx with moist mucus membranes, tonsils without erythema or exudates, gums normal and good dentition. NECK:  Supple, symmetrical, trachea midline, no adenopathy, thyroid symmetric, not enlarged and no tenderness, skin normal  LUNGS:  No increased work of breathing, good air exchange, clear to auscultation bilaterally, no crackles or wheezing  CARDIOVASCULAR:  Normal apical impulse, regular rate and rhythm, normal S1 and S2, no S3 or S4, and no murmur noted  ABDOMEN:  No scars, normal bowel sounds, soft, non-distended, non-tender, no masses palpated, no hepatosplenomegally  MUSCULOSKELETAL:  There is no redness, warmth, or swelling of the joints. Full range of motion noted. Motor strength is 5 out of 5 all extremities bilaterally. Tone is normal.  NEUROLOGIC:  Awake, alert, oriented to name, place and time. Cranial nerves II-XII are grossly intact. Motor is 5 out of 5 bilaterally. Sensory is intact.  gait is normal.  SKIN:  no bruising or bleeding, normal skin color, texture, turgor, no redness, warmth, or swelling and no jaundice    DATA:     Diagnostic tests reviewed for today's visit:    Most recent labs and imaging results reviewed.      ASSESSMENT AND PLAN    Major depressive disorder, severe ([address]  Patient admitted to behavorial health for evaluation and treatment     HTN: BP stable; monitor     This is only a history and physical examination and not medical management. The patient is to contact and follow up with their primary care physician and go over any abnormal labs, imaging, findings, medical concerns, or conditions that we have and have not addressed during this encounter.     Plan of care discussed with: patient    SIGNATURE: KING Benz NP  DATE: April 5, 2022  TIME: 9:07 AM

## 2022-04-05 NOTE — H&P
[address] [place] [address] Department of Psychiatry    History and Physical - Adult         CHIEF COMPLAINT:  Depression SI    History obtained from:  patient    Patient was seen after discussing with the treatment team and reviewing the chart        CIRCUMSTANCES OF ADMISSION:     Patient reports he and his wife are in the process of  and Ethelene Backers been since [place]". He moved and with his sister he and his sister argued. Pt reports he is able to return to the home of his sister after discharge despite this. \" I just know it is over now, I am reconciled to it \" It can end one of two ways by divorce or by suicide\"  \"Suicide is looking good right now\"    HISTORY OF PRESENT ILLNESS:      The patient is a 59 y.o. male going through divorce, work for a company with no significant past history of mental illness    Pt has been going through divorce since last August, and has been feeling depressed, with multiple questions. Pt has been feeling overwhelmed and anxious. Daughter has been drinking drugging and accusing his wife(mom) of cheating on the patient 25 years ago. Live with sister  Depression Severity: Rating mood to be around 2/10 (10- good)  Quality:melancholic  Worse all day  Content: Hopeless, worthless and helpless feeling  Suicidal thoughts - active, thought about many plan  Associated symptoms:  Poor concentration, anhedonia, decrease motivation  Sleep and appetite- poor    The patient is not currently receiving care for the above psychiatric illness. Medications Prior to Admission:   No medications prior to admission.     Compliance:n/a    Psychiatric Review of Systems       Depression: yes     Gricelda or Hypomania:  no     Panic Attacks:  yes      Phobias:  no     Obsessions and Compulsions:  no     PTSD : no     Hallucinations:  no     Delusions:  no    Substance Abuse History:  ETOH: 2-3 beers 3 times per week   Marijuana: no  Opiates: no  Other Drugs: no      Past Psychiatric History:  Prior Diagnosis: Never been diagnosed with mental illness  Psychiatrist: no  Therapist:no  Hospitalization: no  Hx of Suicidal Attempts: no  Hx of violence:  no  ECT: no  Previous discontinued Psychiatric Med Trials: no    Past Medical History:        Diagnosis Date    Hypertension        Past Surgical History:        Procedure Laterality Date    LIPOMA RESECTION         Allergies:   Patient has no known allergies. Family History  History reviewed. No pertinent family history. Social History:  Born and Raised: [place]  Describes Childhood:   supportive  Education: [employer]  Employment: Employed full time  Relationships:   Children: 1  Current Support: extended family    Legal Hx: none  Access to weapons?:  No      EXAMINATION:    REVIEW OF SYSTEMS:    ROS:  [x] All negative/unchanged except if checked.  Explain positive(checked items) below:  [] Constitutional  [] Eyes  [] Ear/Nose/Mouth/Throat  [] Respiratory  [] CV  [] GI  []   [] Musculoskeletal  [] Skin/Breast  [] Neurological  [] Endocrine  [] Heme/Lymph  [] Allergic/Immunologic    Explanation:     Vitals:  /85   Pulse 85   Temp 98.3 °F (36.8 °C) (Oral)   Resp 18   Ht 5' 7\" (1.702 m)   Wt 180 lb (81.6 kg)   SpO2 98%   BMI 28.19 kg/m²      Neurologic Exam:   Muscle Strength & Tone: full ROM  Gait: normal gait   Involuntary Movements: No    Mental Status Examination:    Level of consciousness:  within normal limits   Appearance:  ill-appearing  Behavior/Motor:  psychomotor retardation  Attitude toward examiner:  cooperative  Speech:  slow   Mood: decreased range and depressed  Affect:  mood congruent  Thought processes:  slow   Thought content:  Suicidal Ideation:  passive  Delusions:  no evidence of delusions  Perceptual Disturbance:  denies any perceptual disturbance  Cognition:  oriented to person, place, and time   Concentration poor  Memory intact  Insight poor   Judgement poor   Fund of Knowledge limited    Mini Mental Status 30/30      DIAGNOSIS:     Major depressive disorder; single episode, severe and without psychotic features   MELISSA        RISK ASSESSMENT:    SUICIDE RISK ASSESSMENT: moderate to high  HOMICIDE: low  AGITATION/VIOLENCE: low  ELOPEMENT: low    LABS: REVIEWED TODAY:  Recent Labs     04/04/22 0944   WBC 8.5   HGB 14.6        Recent Labs     04/04/22 0944      K 4.0      CO2 24   BUN 19   CREATININE 0.91   GLUCOSE 110*     Recent Labs     04/04/22 0944   BILITOT 0.5   ALKPHOS 70   AST 20   ALT 18     Lab Results   Component Value Date    LABAMPH Neg 04/04/2022    BARBSCNU Neg 04/04/2022    LABBENZ Neg 04/04/2022    LABMETH Neg 04/04/2022    OPIATESCREENURINE Neg 04/04/2022    PHENCYCLIDINESCREENURINE Neg 04/04/2022    ETOH <10 04/04/2022     Lab Results   Component Value Date    TSH 4.410 04/04/2022     No results found for: LITHIUM  No results found for: VALPROATE, CBMZ  No results found for: LITHIUM, VALPROATE    FURTHER LABS ORDERED :      Radiology   No results found. EKG: TRACING REVIEWED    TREATMENT PLAN:    Risk Management:  close watch and suicide risk    Collateral Information:  Will obtain collateral information from the family or friends. Will obtain medical records as appropriate from out patient providers  Will consult the hospitalist for a physical exam to rule out any co-morbid physical condition. Home medication Reconciled       New Medications started during this admission :    See orders  Prn Haldol 5mg and Vistaril 50mg q6hr for extreme agitation. Trazodone as ordered for insomnia  Vistaril as ordered for anxiety  Discussed with the patient risk, benefit, alternative and common side effects for the  proposed medication treatment. Patient is consenting to the treatment.     Psychotherapy:   Encourage participation in milieu and group therapy  Individual therapy as needed      Electronically signed by Viktoriya Ordonez MD on 4/5/2022 at 9:49 AM

## 2022-04-05 NOTE — GROUP NOTE
Group Therapy Note    Date: 4/5/2022    Group Start Time: 0200  Group End Time: 0230  Group Topic: Cognitive Skills    [facility]    MANNY Vee        Group Therapy Note    Attendees: 8/15         Patient's Goal:  \"to engage in conversation prompts and answer questions appropriately. \"      Notes:  N/a     Status After Intervention:  Improved    Participation Level:  Active Listener and Interactive    Participation Quality: Appropriate, Attentive, Sharing and Supportive      Speech:  normal      Thought Process/Content: Logical      Affective Functioning: Flat      Mood: anxious      Level of consciousness:  Alert      Response to Learning: Progressing to goal      Endings: None Reported    Modes of Intervention: Education      Discipline Responsible: /Counselor      Signature:  MANNY Vee

## 2022-04-05 NOTE — GROUP NOTE
Group Therapy Note    Date: 4/4/2022    Group Start Time: 2100  Group End Time: 2110  Group Topic: Wrap-Up    [facility]    Tyrell Menon        Group Therapy Note    Attendees: 8/17         Patient's Goal:  \"to get help\"    Notes:  Patient reported meeting their goal for the day. Patient shared he was happy his friend was up here when he arrived to the unit.     Status After Intervention:  Unchanged    Participation Level: Interactive    Participation Quality: Appropriate, Attentive and Sharing      Speech:  normal      Thought Process/Content: Logical      Affective Functioning: Congruent      Mood: euthymic      Level of consciousness:  Alert and Attentive      Response to Learning: Progressing to goal      Endings: None Reported    Modes of Intervention: Support      Discipline Responsible: [employer]      Signature:  Tyrell Menon

## 2022-04-06 PROCEDURE — 1240000000 HC EMOTIONAL WELLNESS R&B

## 2022-04-06 PROCEDURE — 90833 PSYTX W PT W E/M 30 MIN: CPT | Performed by: PSYCHIATRY & NEUROLOGY

## 2022-04-06 PROCEDURE — 99232 SBSQ HOSP IP/OBS MODERATE 35: CPT | Performed by: PSYCHIATRY & NEUROLOGY

## 2022-04-06 PROCEDURE — 6370000000 HC RX 637 (ALT 250 FOR IP): Performed by: PSYCHIATRY & NEUROLOGY

## 2022-04-06 RX ADMIN — MIRTAZAPINE 15 MG: 15 TABLET, FILM COATED ORAL at 21:26

## 2022-04-06 NOTE — PROGRESS NOTES
[facility] FOLLOW-UP NOTE       4/6/2022     Patient was seen and examined in person, Chart reviewed   Patient's case discussed with staff/team    Chief Complaint: Depression    Interim History:     Pt report feeling better  Not in withdrawal  Slept better last night  Mood better  No active SI or HI  Planing to go and stay with sister   Want to go back to work    Appetite:   [x] Normal/Unchanged  [] Increased  [] Decreased      Sleep:       [] Normal/Unchanged  [x] Fair       [] Poor              Energy:    [x] Normal/Unchanged  [] Increased  [] Decreased        SI [] Present  [x] Absent    HI  []Present  [x] Absent     Aggression:  [] yes  [x] no    Patient is [] able  [] unable to CONTRACT FOR SAFETY     PAST MEDICAL/PSYCHIATRIC HISTORY:   Past Medical History:   Diagnosis Date    Hypertension        FAMILY/SOCIAL HISTORY:  History reviewed. No pertinent family history. Social History     Socioeconomic History    Marital status:      Spouse name: Not on file    Number of children: Not on file    Years of education: Not on file    Highest education level: Not on file   Occupational History    Not on file   Tobacco Use    Smoking status: Never Smoker    Smokeless tobacco: Never Used   Substance and Sexual Activity    Alcohol use: Yes    Drug use: Yes     Types: Marijuana [employer])    Sexual activity: Not on file   Other Topics Concern    Not on file   Social History Narrative    Not on file     Social Determinants of Health     Financial Resource Strain:     Difficulty of Paying Living Expenses: Not on file   Food Insecurity:     Worried About Running Out of Food in the Last Year: Not on file    Nora of Food in the Last Year: Not on file   Transportation Needs:     Lack of Transportation (Medical): Not on file    Lack of Transportation (Non-Medical):  Not on file   Physical Activity:     Days of Exercise per Week: Not on file    Minutes of Exercise per Session: Not on file   Stress:     Feeling of Stress : Not on file   Social Connections:     Frequency of Communication with Friends and Family: Not on file    Frequency of Social Gatherings with Friends and Family: Not on file    Attends Moravian Services: Not on file    Active Member of Clubs or Organizations: Not on file    Attends Club or Organization Meetings: Not on file    Marital Status: Not on file   Intimate Partner Violence:     Fear of Current or Ex-Partner: Not on file    Emotionally Abused: Not on file    Physically Abused: Not on file    Sexually Abused: Not on file   Housing Stability:     Unable to Pay for Housing in the Last Year: Not on file    Number of [place] in the Last Year: Not on file    Unstable Housing in the Last Year: Not on file           ROS:  [x] All negative/unchanged except if checked.  Explain positive(checked items) below:  [] Constitutional  [] Eyes  [] Ear/Nose/Mouth/Throat  [] Respiratory  [] CV  [] GI  []   [] Musculoskeletal  [] Skin/Breast  [] Neurological  [] Endocrine  [] Heme/Lymph  [] Allergic/Immunologic    Explanation:     MEDICATIONS:    Current Facility-Administered Medications:     mirtazapine (REMERON) tablet 15 mg, 15 mg, Oral, Nightly, Toya Hoff MD, 15 mg at 04/05/22 2059    acetaminophen (TYLENOL) tablet 650 mg, 650 mg, Oral, Q4H PRN, Toya Hoff MD    magnesium hydroxide (MILK OF MAGNESIA) 400 MG/5ML suspension 30 mL, 30 mL, Oral, Daily PRN, Toya Hoff MD    aluminum & magnesium hydroxide-simethicone (MAALOX) 200-200-20 MG/5ML suspension 30 mL, 30 mL, Oral, PRN, Toya Hoff MD    haloperidol (HALDOL) tablet 5 mg, 5 mg, Oral, Q6H PRN **OR** haloperidol lactate (HALDOL) injection 5 mg, 5 mg, IntraMUSCular, Q6H PRN, Toya Hoff MD    benztropine mesylate (COGENTIN) injection 2 mg, 2 mg, IntraMUSCular, BID PRN, Toya Hoff MD    hydrOXYzine (VISTARIL) capsule 50 mg, 50 mg, Oral, Q6H PRN **OR** hydrOXYzine (VISTARIL) injection 50 mg, 50 mg, IntraMUSCular, Q6H PRN, Melinda Spence MD    traZODone (DESYREL) tablet 50 mg, 50 mg, Oral, Nightly PRN, Melinda Spence MD      Examination:  /87   Pulse 86   Temp 98.8 °F (37.1 °C)   Resp 18   Ht 5' 7\" (1.702 m)   Wt 180 lb (81.6 kg)   SpO2 95%   BMI 28.19 kg/m²   Gait - steady  Medication side effects(SE): no    Mental Status Examination:    Level of consciousness:  within normal limits   Appearance:  fair grooming and fair hygiene  Behavior/Motor:  no abnormalities noted  Attitude toward examiner:  cooperative  Speech:  spontaneous   Mood: anxious  Affect:  mood congruent  Thought processes:  linear   Thought content:  Suicidal Ideation:  denies suicidal ideation  Cognition:  oriented to person, place, and time   Concentration intact  Insight fair   Judgement fair     ASSESSMENT:   Patient symptoms are:  [] Well controlled  [] Improving  [] Worsening  [] No change      Diagnosis:   Principal Problem:    Major depressive disorder, severe ([facility] [address]  Resolved Problems:    * No resolved hospital problems. *      LABS:    Recent Labs     04/04/22  0944   WBC 8.5   HGB 14.6        Recent Labs     04/04/22  0944      K 4.0      CO2 24   BUN 19   CREATININE 0.91   GLUCOSE 110*     Recent Labs     04/04/22  0944   BILITOT 0.5   ALKPHOS 70   AST 20   ALT 18     Lab Results   Component Value Date    LABAMPH Neg 04/04/2022    BARBSCNU Neg 04/04/2022    LABBENZ Neg 04/04/2022    LABMETH Neg 04/04/2022    OPIATESCREENURINE Neg 04/04/2022    PHENCYCLIDINESCREENURINE Neg 04/04/2022    ETOH <10 04/04/2022     Lab Results   Component Value Date    TSH 4.410 04/04/2022     No results found for: LITHIUM  No results found for: VALPROATE, CBMZ      Treatment Plan:  Reviewed current Medications with the patient. Medication as ordered  Risks, benefits, side effects, drug-to-drug interactions and alternatives to treatment were discussed.   Collateral information: pending from sister  CD evaluation  Encourage patient to attend group and other milieu activities. Discharge planning discussed with the patient and treatment team.    PSYCHOTHERAPY/COUNSELING:  [x] Therapeutic interview  [x] Supportive  [] CBT  [] Ongoing  [] Other  Patient was seen 1:1 for 20 minutes, other than E&M time spent, focusing on      - coping skills techniques     - Anxiety management techniques discussed including deep breathing exercise and PMR     - discussing patients strength and weakness      - Motivational interviewing to assess the stage of change and assessing patient readiness to quit substance use.      - Focusing on negative cognition and maladaptive thoughts, which is feeding and maintaining the depression symptoms       [x] Patient continues to need, on a daily basis, active treatment furnished directly by or requiring the supervision of inpatient psychiatric personnel      Anticipated Length of stay: tomorrow or friday            Electronically signed by Maldonado Mercedes MD on 4/6/2022 at 4:31 PM

## 2022-04-06 NOTE — GROUP NOTE
Group Therapy Note    Date: 4/6/2022    Group Start Time: 1100  Group End Time: 5427  Group Topic: Psychotherapy    [facility] MANNY Zambrano        Group Therapy Note    Attendees: 12/17 (two excused from group due to symptoms)        Patient's Goal:  \"positive attitude and encourage others. \"     Notes:  Appeared to be dependent on others     Status After Intervention:  Improved    Participation Level:  Active Listener and Interactive    Participation Quality: Appropriate, Attentive, Sharing and Supportive      Speech:  normal      Thought Process/Content: Logical      Affective Functioning: Flat      Mood: anxious and depressed      Level of consciousness:  Alert      Response to Learning: Progressing to goal      Endings: None Reported    Modes of Intervention: Education      Discipline Responsible: /Counselor      Signature:  MANNY Casey

## 2022-04-06 NOTE — GROUP NOTE
Group Therapy Note    Date: 4/5/2022    Group Start Time: 1930  Group End Time: 2030  Group Topic: Recreational    [facility]    DENIA Ortiz LSW        Group Therapy Note    Attendees: 11         Patient's Goal:  To participate in a recreational activity    Notes:  Patient participated in an icebreaker activity    Status After Intervention:  Improved    Participation Level: Interactive    Participation Quality: Sharing      Speech:  normal      Thought Process/Content: Logical      Affective Functioning: Congruent      Mood: calm      Level of consciousness:  Alert      Response to Learning: Able to verbalize current knowledge/experience      Endings: None Reported    Modes of Intervention: Education      Discipline Responsible: /Counselor      Signature:  DENIA Ortiz LSW

## 2022-04-06 NOTE — GROUP NOTE
Group Therapy Note    Date: 4/6/2022    Group Start Time: 1300  Group End Time: 1350  Group Topic: Healthy Living/Wellness    [facility]    Felicia Mayberry RN        Group Therapy Note    Attendees: 10         Patient's Goal: To learn about benefits of guided journaling for a coping skill, self exploration and to promote positivity. Notes: Pt actively and appropriately participated in group activity. Status After Intervention:  Unchanged    Participation Level:  Active Listener and Interactive    Participation Quality: Appropriate, Attentive, Sharing and Supportive      Speech:  normal      Thought Process/Content: Logical  Linear      Affective Functioning: Congruent      Mood: euthymic      Level of consciousness:  Alert and Oriented x4      Response to Learning: Able to verbalize current knowledge/experience, Able to verbalize/acknowledge new learning and Able to retain information      Endings: None Reported    Modes of Intervention: Education, Support, Socialization, Exploration and Clarifying      Discipline Responsible: Registered Nurse      Signature:  Felicia Mayberry RN

## 2022-04-06 NOTE — GROUP NOTE
Group Therapy Note    Date: 4/6/2022    Group Start Time: 0022  Group End Time: 1700  Group Topic: Healthy Living/Wellness    [facility]    Nereyda Kruger        Group Therapy Note    Attendees: 11/18         Patient's Goal:  To learn how to live a healthy lifestyle. Notes:  Patient participated in group discussion.     Status After Intervention:  Unchanged    Participation Level: Interactive    Participation Quality: Appropriate, Attentive and Sharing      Speech:  normal      Thought Process/Content: Logical      Affective Functioning: Congruent      Mood: euthymic      Level of consciousness:  Alert and Attentive      Response to Learning: Progressing to goal      Endings: None Reported    Modes of Intervention: Education      Discipline Responsible: [address], [employer]      Signature:  Nereyda Kruger

## 2022-04-06 NOTE — PROGRESS NOTES
Morning Community Meeting Topics    Karma Antwon attended the morning community meeting on 4/6/22. Topics discussed today     [x] Introduction   Day of the week and date   Mask distribution   Current mask requirements  [x]Teams   Explanation of  Green and Blue team criteria   Nurses assigned to each team for today   Explanation about green and blue paper  o Date  o Patient's Name  o Patient's Nurse  o Goals  [x] Visitation   Announce the visiting hours for the day   Announce which team is allowed to have visitors for the day   Review any updated Covid 19 requirements for visitors during visitation  o Vaccine Card or negative Covid test within 48 hours of visit  o State Identification   Patients are reminded to alert the  at least 1 hour before visitation   [x] Unit Orientation   Coffee use   Phone location and etiquette   Shower locations  [employer] and dryer location and process   Common area expectations   Staff rounds expectation  [x] Meals    Educate patient to the menu  o The patient is encouraged to fill out the menu to get preferences at mealtime  o The patient is educated that if they do not fill out the menu, they will get the standard tray  o The coffee pot is decaf, patient encouraged to order regular coffee from menu.    Educate patient to the meal process   Patient encouraged to eat snacks provided twice daily  o Snacks may stay in patient room     [x] Discharge Process   Discharge expectations   Fill out the survey after discharge   [x] Hygiene   Daily showers encouraged  o Showers availability discussed    Daily dressing encouraged  o Discussed wearing street clothing   Education provided on where to place linens and clothing  o Linens in the hamper  o personal clothing does not go into the linen hamper  [x] Group    Patient encouraged to attend group provided   Time of Group Meetings discussed   Gentle reminder that attendance is a Physician order  [x] Movement   Chair exercises completed   Stretching completed  Notes:Goal - \"To be happy and have serenity\" Electronically signed by ERWIN Dias on 4/6/2022 at 10:02 AM

## 2022-04-06 NOTE — CARE COORDINATION
Pt cooperative with assessment. Denies all. Denies depression and anxiety. Pt has slightly pressured speech. Pt reports feeling tired last night, but then got \"a second wind. \"  \"I don't want to feel like I'm in a cloud\" in regards to sleeping medication. Pt did not take PRN Trazodone. Pt said, \"I try not to take any pills\" and prefers vitamins. Slightly grandiose in behavior and flirtacious. Made a comment that nurse nurse looks better now and used to be too thin, then asked if I lived alone.

## 2022-04-06 NOTE — GROUP NOTE
Group Therapy Note    Date: 4/6/2022    Group Start Time: 1400  Group End Time: 1440  Group Topic: Psychoeducation    [facility]    VIVIANA Solis        Group Therapy Note    Attendees: 8         Patient's Goal:   To participate in \"Connecting [place]\" and learn healthy tips for stress management. Notes:  Patient shared that he enjoyed fixing things. Patient shared goingt into garage and screaming was his way of relieving stress. Status After Intervention:  Improved    Participation Level:  Active Listener and Interactive    Participation Quality: Appropriate, Attentive and Sharing      Speech:  normal      Thought Process/Content: Logical      Affective Functioning: Congruent      Mood: Calm      Level of consciousness:  Alert      Response to Learning: Able to verbalize current knowledge/experience      Endings: None Reported    Modes of Intervention: Education      Discipline Responsible: /Counselor      Signature:  VIVIANA Solis

## 2022-04-06 NOTE — PROGRESS NOTES
Pt reports he has no depression or anxiety @ present time, compared to when he was admitted both levels were # 10/10. Denies SI/HI/AVH. Pt reports good appetite, sleeps @ least 7-8 hrs. Pt reports attending all groups. Pt reports showering daily.

## 2022-04-07 VITALS
TEMPERATURE: 98.1 F | BODY MASS INDEX: 28.25 KG/M2 | WEIGHT: 180 LBS | HEIGHT: 67 IN | HEART RATE: 81 BPM | SYSTOLIC BLOOD PRESSURE: 125 MMHG | DIASTOLIC BLOOD PRESSURE: 85 MMHG | RESPIRATION RATE: 15 BRPM | OXYGEN SATURATION: 99 %

## 2022-04-07 PROCEDURE — 99239 HOSP IP/OBS DSCHRG MGMT >30: CPT | Performed by: PSYCHIATRY & NEUROLOGY

## 2022-04-07 RX ORDER — MIRTAZAPINE 15 MG/1
15 TABLET, FILM COATED ORAL NIGHTLY
Qty: 15 TABLET | Refills: 3 | Status: SHIPPED | OUTPATIENT
Start: 2022-04-07

## 2022-04-07 NOTE — PROGRESS NOTES
Morning Community Meeting Topics    Iris Oconnell attended the morning community meeting on 4/7/22. Topics discussed today     [x] Introduction   Day of the week and date   Mask distribution   Current mask requirements  [x]Teams   Explanation of  Green and Blue team criteria   Nurses assigned to each team for today   Explanation about green and blue paper  o Date  o Patient's Name  o Patient's Nurse  o Goals  [x] Visitation   Announce the visiting hours for the day   Announce which team is allowed to have visitors for the day   Review any updated Covid 19 requirements for visitors during visitation  o Vaccine Card or negative Covid test within 48 hours of visit  o State Identification   Patients are reminded to alert the  at least 1 hour before visitation   [x] Unit Orientation   Coffee use   Phone location and etiquette   Shower locations  [employer] and dryer location and process   Common area expectations   Staff rounds expectation  [x] Meals    Educate patient to the menu  o The patient is encouraged to fill out the menu to get preferences at mealtime  o The patient is educated that if they do not fill out the menu, they will get the standard tray  o The coffee pot is decaf, patient encouraged to order regular coffee from menu.    Educate patient to the meal process   Patient encouraged to eat snacks provided twice daily  o Snacks may stay in patient room     [x] Discharge Process   Discharge expectations   Fill out the survey after discharge   [x] Hygiene   Daily showers encouraged  o Showers availability discussed    Daily dressing encouraged  o Discussed wearing street clothing   Education provided on where to place linens and clothing  o Linens in the hamper  o personal clothing does not go into the linen hamper  [x] Group    Patient encouraged to attend group provided   Time of Group Meetings discussed   Gentle reminder that attendance is a Physician order  [x] Movement   Chair exercises completed   Stretching completed  Notes:Goal - \"Continue to get better and take one day at a time\" Electronically signed by ERWIN Deleon on 4/7/2022 at 1:45 PM

## 2022-04-07 NOTE — GROUP NOTE
Group Therapy Note    Date: 4/7/2022    Group Start Time: 1000  Group End Time: 0065  Group Topic: Psychoeducation    [facility]    Leana Montes        Group Therapy Note    Attendees: 11         Patient's Goal:  \"Continue to get better and take one day at a time\"    Notes:  Patient attended the 1000 skills group. Patient was talkative, expressive and sociable in group. He shared openly and he work well on his task. Status After Intervention:  Improved    Participation Level:  Active Listener    Participation Quality: Appropriate and Attentive      Speech:  normal      Thought Process/Content: Logical  Linear      Affective Functioning: Congruent      Mood: calm      Level of consciousness:  Alert      Response to Learning: Able to retain information      Endings: None Reported    Modes of Intervention: Education, Socialization and Activity      Discipline Responsible: Psychoeducational Specialist      Signature:  Leana Montes

## 2022-04-07 NOTE — GROUP NOTE
Group Therapy Note    Date: 4/6/2022    Group Start Time: 2030  Group End Time: 2040  Group Topic: Wrap-Up    [facility] DIPTI Smith        Group Therapy Note    Attendees: 13/18         Patient's Goal:  \"to be happy\"    Notes:  Patient reported meeting their goal for the day. Patient shared he enjoyed a nap today.     Status After Intervention:  Unchanged    Participation Level: Interactive    Participation Quality: Appropriate, Attentive and Sharing      Speech:  normal      Thought Process/Content: Logical      Affective Functioning: Congruent      Mood: euthymic      Level of consciousness:  Alert and Attentive      Response to Learning: Progressing to goal      Endings: None Reported    Modes of Intervention: Support      Discipline Responsible: [employer]      Signature:  Bianca Smith

## 2022-04-07 NOTE — GROUP NOTE
Group Therapy Note    Date: 4/6/2022    Group Start Time: 2000  Group End Time: 2030  Group Topic: Recreational    [facility]    Amor Ty        Group Therapy Note    Attendees: 12/18         Patient's Goal:  To play Sasha Bad Wii and/or socialize with the group. Notes:  Patient played the game with peers.     Status After Intervention:  Unchanged    Participation Level: Interactive    Participation Quality: Appropriate and Attentive      Speech:  normal      Thought Process/Content: Logical      Affective Functioning: Congruent      Mood: euthymic      Level of consciousness:  Alert and Attentive      Response to Learning: Progressing to goal      Endings: None Reported    Modes of Intervention: Activity      Discipline Responsible: [address], [place]      Signature:  Amor Ty

## 2022-04-07 NOTE — PROGRESS NOTES
Pt visible on unit today, social with peers. Pt pleasant and cooperative. Pt denies depression or anxiety. Pt denies SI, HI, and Hallucinations. Pt reports good sleep and appetite. Pt states he is planning on staying with his sister after discharge.  Electronically signed by Isaías Rae RN on 4/7/22 at 2:33 PM EDT

## 2022-04-07 NOTE — PROGRESS NOTES
Discharge instructions reviewed verbally and in writing including f/u appointments. Patient verbalizes understanding and signed as such. All belongings returned for discharge. Patient denies SI, HI, A/V hallucinations, mood is stable. Pt walked down to meet cab.  Electronically signed by Lorena Ward RN on 4/7/22 at 4:34 PM EDT

## 2022-04-07 NOTE — DISCHARGE SUMMARY
DISCHARGE SUMMARY      Patient ID:  Minor Smith  [id number]  59 y.o.  1960      Admit date: 4/4/2022    Discharge date and time: 4/7/2022    Admitting Physician: Sawyer Mchugh MD     Discharge Physician: Dr Betito Bergman MD    Admission Diagnoses: Suicidal ideation [R45.851]  Major depressive disorder, severe ([address] [F32.2]    Admission Condition: poor    Discharged Condition: stable    Admission Circumstance:     Patient reports he and his wife are in the process of  and Marquita Becker been since August\". Ad Phelps moved and with his sister he and his sister argued. Pt reports he is able to return to the home of his sister after discharge despite this. \" I just know it is over now, I am reconciled to it \" It can end one of two ways by divorce or by suicide\"  \"Suicide is looking good right now\"     HISTORY OF PRESENT ILLNESS:       The patient is a 59 y.o. male going through divorce, work for a company with no significant past history of mental illness     Pt has been going through divorce since last August, and has been feeling depressed, with multiple questions. Pt has been feeling overwhelmed and anxious. Daughter has been drinking drugging and accusing his wife(mom) of cheating on the patient 25 years ago.  Live with sister  Depression Severity: Rating mood to be around 2/10 (10- good)  Quality:melancholic  Worse all day  Content: Hopeless, worthless and helpless feeling  Suicidal thoughts - active, thought about many plan  Associated symptoms:  Poor concentration, anhedonia, decrease motivation  Sleep and appetite- poor     The patient is not currently receiving care for the above psychiatric illness.     Medications Prior to Admission:   Prescriptions Prior to Admission   No medications prior to admission.        Compliance:n/a     Psychiatric Review of Systems       Depression: yes     Gricelda or Hypomania:  no     Panic Attacks:  yes      Phobias:  no     Obsessions and Compulsions:  no     PTSD : no Hallucinations:  no     Delusions:  no     Substance Abuse History:  ETOH: 2-3 beers 3 times per week   Marijuana: no  Opiates: no  Other Drugs: no        Past Psychiatric History:  Prior Diagnosis:  Never been diagnosed with mental illness  Psychiatrist: no  Therapist:no  Hospitalization: no  Hx of Suicidal Attempts: no  Hx of violence:  no  ECT: no  Previous discontinued Psychiatric Med Trials: no        PAST MEDICAL/PSYCHIATRIC HISTORY:   Past Medical History:   Diagnosis Date    Hypertension        FAMILY/SOCIAL HISTORY:  History reviewed. No pertinent family history. Social History     Socioeconomic History    Marital status:      Spouse name: Not on file    Number of children: Not on file    Years of education: Not on file    Highest education level: Not on file   Occupational History    Not on file   Tobacco Use    Smoking status: Never Smoker    Smokeless tobacco: Never Used   Substance and Sexual Activity    Alcohol use: Yes    Drug use: Yes     Types: Marijuana Curlie Opal)    Sexual activity: Not on file   Other Topics Concern    Not on file   Social History Narrative    Not on file     Social Determinants of Health     Financial Resource Strain:     Difficulty of Paying Living Expenses: Not on file   Food Insecurity:     Worried About Running Out of Food in the Last Year: Not on file    Nora of Food in the Last Year: Not on file   Transportation Needs:     Lack of Transportation (Medical): Not on file    Lack of Transportation (Non-Medical):  Not on file   Physical Activity:     Days of Exercise per Week: Not on file    Minutes of Exercise per Session: Not on file   Stress:     Feeling of Stress : Not on file   Social Connections:     Frequency of Communication with Friends and Family: Not on file    Frequency of Social Gatherings with Friends and Family: Not on file    Attends Synagogue Services: Not on file    Active Member of Clubs or Organizations: Not on file    Attends Club or Organization Meetings: Not on file    Marital Status: Not on file   Intimate Partner Violence:     Fear of Current or Ex-Partner: Not on file    Emotionally Abused: Not on file    Physically Abused: Not on file    Sexually Abused: Not on file   Housing Stability:     Unable to Pay for Housing in the Last Year: Not on file    Number of Places Lived in the Last Year: Not on file    Unstable Housing in the Last Year: Not on file       MEDICATIONS:    Current Facility-Administered Medications:     mirtazapine (REMERON) tablet 15 mg, 15 mg, Oral, Nightly, Landon Beasley MD, 15 mg at 04/06/22 2126    acetaminophen (TYLENOL) tablet 650 mg, 650 mg, Oral, Q4H PRN, Landon Beasley MD    magnesium hydroxide (MILK OF MAGNESIA) 400 MG/5ML suspension 30 mL, 30 mL, Oral, Daily PRN, Landon Beasley MD    aluminum & magnesium hydroxide-simethicone (MAALOX) 200-200-20 MG/5ML suspension 30 mL, 30 mL, Oral, PRN, Landon Beasley MD    haloperidol (HALDOL) tablet 5 mg, 5 mg, Oral, Q6H PRN **OR** haloperidol lactate (HALDOL) injection 5 mg, 5 mg, IntraMUSCular, Q6H PRN, Landon Beasley MD    benztropine mesylate (COGENTIN) injection 2 mg, 2 mg, IntraMUSCular, BID PRN, Landon Beasley MD    hydrOXYzine (VISTARIL) capsule 50 mg, 50 mg, Oral, Q6H PRN **OR** hydrOXYzine (VISTARIL) injection 50 mg, 50 mg, IntraMUSCular, Q6H PRN, Landon Beasley MD    traZODone (DESYREL) tablet 50 mg, 50 mg, Oral, Nightly PRN, Landon Beasley MD    Examination:  /85   Pulse 81   Temp 98.1 °F (36.7 °C)   Resp 15   Ht 5' 7\" (1.702 m)   Wt 180 lb (81.6 kg)   SpO2 99%   BMI 28.19 kg/m²   Gait - steady    HOSPITAL COURSE[de-identified]  Following admission to the hospital, patient had a complete physical exam and blood work up  Patient was monitored closely with suicide precaution  Patient was started on medication as listed below  Was encouraged to participate in group and other milieu activity  Patient started to feel better with this combination of treatment. Significant progress in the symptoms since admission. Mood better, with the score of 2/10 - bad  No AVH or paranoid thoughts  No Hopeless or worthless feeling  No active SI/HI  Appetite:  [x] Normal  [] Increased  [] Decreased    Sleep:       [x] Normal  [] Fair       [] Poor            Energy:    [x] Normal  [] Increased  [] Decreased     SI [] Present  [x] Absent  HI  []Present  [x] Absent   Aggression:  [] yes  [] no  Patient is [x] able  [] unable to CONTRACT FOR SAFETY   Medication side effects(SE):  [x] None(Psych. Meds.) [] Other      Mental Status Examination on discharge:    Level of consciousness:  within normal limits   Appearance:  well-appearing  Behavior/Motor:  no abnormalities noted  Attitude toward examiner:  attentive and good eye contact  Speech:  spontaneous, normal rate and normal volume   Mood: euthymic  Affect:  mood congruent  Thought processes:  goal directed   Thought content:  Suicidal Ideation:  denies suicidal ideation  Delusions:  no evidence of delusions  Perceptual Disturbance:  denies any perceptual disturbance  Cognition:  oriented to person, place, and time   Concentration intact  Memory intact  Insight good   Judgement fair   Fund of Knowledge adequate      ASSESSMENT:  Patient symptoms are:  [x] Well controlled  [x] Improving  [] Worsening  [] No change      Diagnosis:  Principal Problem:    Major depressive disorder, severe ([address]  Resolved Problems:    * No resolved hospital problems. *      LABS:    No results for input(s): WBC, HGB, PLT in the last 72 hours. No results for input(s): NA, K, CL, CO2, BUN, CREATININE, GLUCOSE in the last 72 hours. No results for input(s): BILITOT, ALKPHOS, AST, ALT in the last 72 hours.   Lab Results   Component Value Date    LABAMPH Neg 04/04/2022    BARBSCNU Neg 04/04/2022    LABBENZ Neg 04/04/2022    LABMETH Neg 04/04/2022    OPIATESCREENURINE Neg 04/04/2022    PHENCYCLIDINESCREENURINE Neg

## 2022-04-07 NOTE — CARE COORDINATION
FAMILY COLLATERAL NOTE    Family/Support Name: Babatunde Tobar #: [phone]  Relationship to Pt[de-identified] sister        Family/Support contact aware of hospitalization: yes   Presenting Symptoms/Current Concerns:  Patient reports he and his wife are in the process of  and [facility] been since August\".  He moved and with his sister he and his sister argued. Pt reports he is able to return to the home of his sister after discharge despite this. \" I just know it is over now, I am reconciled to it \" It can end one of two ways by divorce or by suicide\"  \"Suicide is looking good right now\"    Top 3 Life Stressors:   Divorce  Change- does not take change well      Background History Relevant to Current Hospitalization:  Reports he is used to being in control and providing for the family that he is struggling with the change due to the divorce. Reports pt has been living with sister for 3 months and that is all he talks about. Reports he was brought up to provide, do the right thing, people pleaser. Reports that pt feels like he has nothing else to live for and pt cant understand that people change reports that they were together for 40 some years. Reports stressors of wife and family arguments. Reports daughter is in her 45s and she is now trying to get her life together, reports she was always getting into trouble and he would always bail her out and he struggled to set boundaries with her. Reports she has heard pt talking wanting to hurt himself multiple times and she was able to talk him down but this time was different. Family Mental Health/Substance Use History:   family history of alcoholism, addiction and depression/anxiety. Maternal grandfather hung himself prior to pt being born. Pt's mother was 1years old.        Support Network's Goal for Hospitalization: \"for him to get linked with outpatient and begin to understand change and work on himself. \"    Discharge Plan: discharge home to sisters and link with outpatient services       Support Network Supportive of Discharge Plan:  In agreement      Support can confirm Safety of Location and Security of Weapons: denies       Support agreeable to Safeguard and Monitor Medications (including Prescription and OTC): agreeable     Identified Barriers to Compliance with Discharge Plan:  Divorce     Recommendations for Support Network:  Be available for follow up calls         MANNY Pickens

## 2022-04-07 NOTE — DISCHARGE INSTR - DIET

## 2022-04-07 NOTE — GROUP NOTE
Group Therapy Note    Date: 2022    Group Start Time: 1105  Group End Time: 7846  Group Topic: Psychotherapy    MLOZ 3W I    Erik Cam, [facility]        Group Therapy Note    Attendees: 13         Patient's Goal:  ***    Notes:  ***    Status After Intervention:  {Status After Intervention:725207342}    Participation Level: {Participation Level:724961025}    Participation Quality: {[facility] PARTICIPATION QUALITY:722072046}      Speech:  {[facility] CD_SPEECH:22914}      Thought Process/Content: {Thought Process/Content:747922304}      Affective Functioning: {Affective Functionin}      Mood: {Mood:410206098}      Level of consciousness:  {Level of consciousness:169617909}      Response to Learnin [facility] Responses to Learnin}      Endings: {[facility] Endings:05309}    Modes of Intervention: {[facility] Modes of Intervention:611362199}      Discipline Responsible: {[facility] Multidisciplinary:583554142}      Signature:  Erik Cam, [facility]

## 2022-04-07 NOTE — PROGRESS NOTES
Patient did not attend healthy living group despite staff encouragement.  Electronically signed by Beatris Campo RN on 4/7/22 at 2:29 PM EDT

## 2024-08-11 ENCOUNTER — APPOINTMENT (OUTPATIENT)
Dept: CT IMAGING | Age: 64
End: 2024-08-11
Payer: OTHER MISCELLANEOUS

## 2024-08-11 ENCOUNTER — HOSPITAL ENCOUNTER (EMERGENCY)
Age: 64
Discharge: HOME | End: 2024-08-11
Attending: FAMILY MEDICINE
Payer: OTHER MISCELLANEOUS

## 2024-08-11 ENCOUNTER — APPOINTMENT (OUTPATIENT)
Dept: GENERAL RADIOLOGY | Age: 64
End: 2024-08-11
Payer: OTHER MISCELLANEOUS

## 2024-08-11 VITALS
SYSTOLIC BLOOD PRESSURE: 150 MMHG | HEIGHT: 67 IN | HEART RATE: 80 BPM | OXYGEN SATURATION: 99 % | WEIGHT: 182 LBS | RESPIRATION RATE: 18 BRPM | TEMPERATURE: 98.1 F | DIASTOLIC BLOOD PRESSURE: 92 MMHG | BODY MASS INDEX: 28.56 KG/M2

## 2024-08-11 DIAGNOSIS — M51.34 BULGING OF THORACIC INTERVERTEBRAL DISC: ICD-10-CM

## 2024-08-11 DIAGNOSIS — S00.83XA FACIAL HEMATOMA, INITIAL ENCOUNTER: Primary | ICD-10-CM

## 2024-08-11 DIAGNOSIS — S00.01XA ABRASION OF SCALP, INITIAL ENCOUNTER: ICD-10-CM

## 2024-08-11 PROCEDURE — 73564 X-RAY EXAM KNEE 4 OR MORE: CPT

## 2024-08-11 PROCEDURE — 73030 X-RAY EXAM OF SHOULDER: CPT

## 2024-08-11 PROCEDURE — 72100 X-RAY EXAM L-S SPINE 2/3 VWS: CPT

## 2024-08-11 PROCEDURE — 70450 CT HEAD/BRAIN W/O DYE: CPT

## 2024-08-11 PROCEDURE — 72125 CT NECK SPINE W/O DYE: CPT

## 2024-08-11 PROCEDURE — 72131 CT LUMBAR SPINE W/O DYE: CPT

## 2024-08-11 PROCEDURE — 99285 EMERGENCY DEPT VISIT HI MDM: CPT

## 2024-08-11 PROCEDURE — 72128 CT CHEST SPINE W/O DYE: CPT

## 2024-08-11 RX ORDER — NAPROXEN 500 MG/1
500 TABLET ORAL 2 TIMES DAILY
Qty: 60 TABLET | Refills: 0 | Status: SHIPPED | OUTPATIENT
Start: 2024-08-11

## 2024-08-11 ASSESSMENT — PAIN DESCRIPTION - DESCRIPTORS: DESCRIPTORS: ACHING

## 2024-08-11 ASSESSMENT — LIFESTYLE VARIABLES
HOW MANY STANDARD DRINKS CONTAINING ALCOHOL DO YOU HAVE ON A TYPICAL DAY: PATIENT DOES NOT DRINK
HOW OFTEN DO YOU HAVE A DRINK CONTAINING ALCOHOL: NEVER
HOW OFTEN DO YOU HAVE A DRINK CONTAINING ALCOHOL: NEVER
HOW MANY STANDARD DRINKS CONTAINING ALCOHOL DO YOU HAVE ON A TYPICAL DAY: PATIENT DOES NOT DRINK

## 2024-08-11 ASSESSMENT — PAIN - FUNCTIONAL ASSESSMENT: PAIN_FUNCTIONAL_ASSESSMENT: 0-10

## 2024-08-11 ASSESSMENT — PAIN DESCRIPTION - LOCATION: LOCATION: BACK

## 2024-08-11 ASSESSMENT — PAIN DESCRIPTION - ORIENTATION: ORIENTATION: LEFT;LOWER

## 2024-08-11 ASSESSMENT — PAIN SCALES - GENERAL: PAINLEVEL_OUTOF10: 7
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
## 2024-08-11 NOTE — ED NOTES
Discharge instructions reviewed with patient. Medications reviewed and explained to patient. Patient denies any further questions at this time. Pt encouraged to make follow up appointments with PCP and any speciality referrals.
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
## 2024-12-04 ENCOUNTER — HOSPITAL ENCOUNTER (EMERGENCY)
Facility: HOSPITAL | Age: 64
Discharge: HOME | End: 2024-12-04
Payer: COMMERCIAL

## 2024-12-04 ENCOUNTER — APPOINTMENT (OUTPATIENT)
Dept: RADIOLOGY | Facility: HOSPITAL | Age: 64
End: 2024-12-04
Payer: COMMERCIAL

## 2024-12-04 VITALS
SYSTOLIC BLOOD PRESSURE: 177 MMHG | BODY MASS INDEX: 30.53 KG/M2 | OXYGEN SATURATION: 99 % | WEIGHT: 190 LBS | DIASTOLIC BLOOD PRESSURE: 92 MMHG | HEIGHT: 66 IN | HEART RATE: 98 BPM | RESPIRATION RATE: 20 BRPM | TEMPERATURE: 97 F

## 2024-12-04 DIAGNOSIS — S42.112A CLOSED DISPLACED FRACTURE OF BODY OF LEFT SCAPULA, INITIAL ENCOUNTER: Primary | ICD-10-CM

## 2024-12-04 PROCEDURE — 71250 CT THORAX DX C-: CPT | Performed by: STUDENT IN AN ORGANIZED HEALTH CARE EDUCATION/TRAINING PROGRAM

## 2024-12-04 PROCEDURE — 71250 CT THORAX DX C-: CPT

## 2024-12-04 PROCEDURE — 99284 EMERGENCY DEPT VISIT MOD MDM: CPT | Mod: 25

## 2024-12-04 PROCEDURE — 96372 THER/PROPH/DIAG INJ SC/IM: CPT | Performed by: PHYSICIAN ASSISTANT

## 2024-12-04 PROCEDURE — 2500000004 HC RX 250 GENERAL PHARMACY W/ HCPCS (ALT 636 FOR OP/ED): Performed by: PHYSICIAN ASSISTANT

## 2024-12-04 RX ORDER — KETOROLAC TROMETHAMINE 30 MG/ML
15 INJECTION, SOLUTION INTRAMUSCULAR; INTRAVENOUS ONCE
Status: COMPLETED | OUTPATIENT
Start: 2024-12-04 | End: 2024-12-04

## 2024-12-04 ASSESSMENT — COLUMBIA-SUICIDE SEVERITY RATING SCALE - C-SSRS
1. IN THE PAST MONTH, HAVE YOU WISHED YOU WERE DEAD OR WISHED YOU COULD GO TO SLEEP AND NOT WAKE UP?: NO
2. HAVE YOU ACTUALLY HAD ANY THOUGHTS OF KILLING YOURSELF?: NO
6. HAVE YOU EVER DONE ANYTHING, STARTED TO DO ANYTHING, OR PREPARED TO DO ANYTHING TO END YOUR LIFE?: NO

## 2024-12-04 ASSESSMENT — LIFESTYLE VARIABLES
HAVE PEOPLE ANNOYED YOU BY CRITICIZING YOUR DRINKING: NO
EVER HAD A DRINK FIRST THING IN THE MORNING TO STEADY YOUR NERVES TO GET RID OF A HANGOVER: NO
TOTAL SCORE: 0
EVER FELT BAD OR GUILTY ABOUT YOUR DRINKING: NO
HAVE YOU EVER FELT YOU SHOULD CUT DOWN ON YOUR DRINKING: NO

## 2024-12-04 ASSESSMENT — PAIN DESCRIPTION - PROGRESSION: CLINICAL_PROGRESSION: NOT CHANGED

## 2024-12-04 ASSESSMENT — PAIN DESCRIPTION - PAIN TYPE: TYPE: ACUTE PAIN

## 2024-12-04 ASSESSMENT — PAIN DESCRIPTION - DESCRIPTORS: DESCRIPTORS: ACHING

## 2024-12-04 ASSESSMENT — PAIN DESCRIPTION - FREQUENCY: FREQUENCY: CONSTANT/CONTINUOUS

## 2024-12-04 ASSESSMENT — PAIN SCALES - GENERAL: PAINLEVEL_OUTOF10: 5 - MODERATE PAIN

## 2024-12-04 ASSESSMENT — PAIN DESCRIPTION - ORIENTATION: ORIENTATION: UPPER;LEFT;POSTERIOR

## 2024-12-04 ASSESSMENT — PAIN - FUNCTIONAL ASSESSMENT: PAIN_FUNCTIONAL_ASSESSMENT: 0-10

## 2024-12-04 ASSESSMENT — PAIN DESCRIPTION - LOCATION: LOCATION: BACK

## 2024-12-04 ASSESSMENT — PAIN DESCRIPTION - ONSET: ONSET: SUDDEN

## 2024-12-04 NOTE — ED PROVIDER NOTES
HPI   Chief Complaint   Patient presents with    Fall     Fell down steps. Slid down his butt and back. C/O left shoulder blade pain.         History provided by:  Patient   used: No      64-year-old male presents with pain to his left shoulder blade after he had a fall down the stairs 5 hours ago.  He states that he slid on his butt.  He states that he was wearing socks on a slippery floor that is why he fell.  Denies hitting his head.  He landed on his left side.  He already has a rotator cuff injury and cannot fully abduct his left arm.  Hurts to move his left arm.  Denies swelling, temp or sensation changes.  Denies n/v/d, abd or back pain, SOB or CP      ROS negative unless otherwise stated in HPI        Patient History   History reviewed. No pertinent past medical history.  History reviewed. No pertinent surgical history.  No family history on file.  Social History     Tobacco Use    Smoking status: Never     Passive exposure: Never    Smokeless tobacco: Never   Vaping Use    Vaping status: Never Used   Substance Use Topics    Alcohol use: Defer    Drug use: Never       Physical Exam   ED Triage Vitals [12/04/24 1254]   Temperature Heart Rate Respirations BP   36.1 °C (97 °F) 98 20 (!) 177/92      Pulse Ox Temp Source Heart Rate Source Patient Position   99 % Temporal Monitor Sitting      BP Location FiO2 (%)     Right arm --       Physical Exam    General: alert, no distress, well nourished, talking in full and complete sentences  Skin: dry, intact, no rashes  Head: atraumatic  Eyes: EOMI, PERRLA, normal conjunctiva  Throat: no stridor, no hot potato voice  Nose: nares patent  Mouth: mucous membranes moist  CV: +S1/S1  Respiratory: non labored breathing, lungs CTA, no wheezing, no retractions  Extremities: FROM X4, strength +5/5, pulses intact, capillary refill intact, radial pulses equal  Spine: Tender to left shoulder blade, no vertebral tenderness, no step-offs  Neuro: A&Ox3, no focal  neurological deficits  Psych: cooperative, appropriate mood        ED Course & MDM   Diagnoses as of 12/04/24 1456   Closed displaced fracture of body of left scapula, initial encounter                 No data recorded     Elizabeth Coma Scale Score: 15 (12/04/24 1359 : Che Durham RN)                     Labs Reviewed - No data to display   CT chest wo IV contrast   Final Result   Acute minimally displaced comminuted fracture left inferior scapular   body.        MACRO:   None        Signed by: Becca Willard 12/4/2024 2:41 PM   Dictation workstation:   [id number]              Medical Decision Making  Final read of CT chest shows acute minimally displaced comminuted fracture of the left inferior scapular body.  Findings reviewed with Dr. Horta and Dr. Mcguire, who both recommend arm sling and follow-up outpatient with Ortho.  Sling applied by the nurse.  Fracture care initiated in ER.  Neurovascular intact.  Afebrile, not tachycardic, not tachypneic, not hypoxic, tolerating PO, non toxic appearing and ambulating at baseline at discharge. Hemodynamically intact. Patient instructed on return precautions. All questions answered. Patient in agreement with treatment.      Procedure  Procedures     Felicita Eid PA-C  12/04/24 1454

## 2024-12-05 ENCOUNTER — OFFICE VISIT (OUTPATIENT)
Dept: ORTHOPEDIC SURGERY | Facility: CLINIC | Age: 64
End: 2024-12-05
Payer: COMMERCIAL

## 2024-12-05 VITALS — WEIGHT: 190 LBS | BODY MASS INDEX: 30.53 KG/M2 | HEIGHT: 66 IN

## 2024-12-05 DIAGNOSIS — M89.8X1 PAIN OF LEFT SCAPULA: ICD-10-CM

## 2024-12-05 DIAGNOSIS — S42.112A CLOSED DISPLACED FRACTURE OF BODY OF LEFT SCAPULA, INITIAL ENCOUNTER: Primary | ICD-10-CM

## 2024-12-05 PROCEDURE — 1159F MED LIST DOCD IN RCRD: CPT | Performed by: FAMILY MEDICINE

## 2024-12-05 PROCEDURE — 99213 OFFICE O/P EST LOW 20 MIN: CPT | Performed by: FAMILY MEDICINE

## 2024-12-05 PROCEDURE — 99204 OFFICE O/P NEW MOD 45 MIN: CPT | Performed by: FAMILY MEDICINE

## 2024-12-05 PROCEDURE — 1036F TOBACCO NON-USER: CPT | Performed by: FAMILY MEDICINE

## 2024-12-05 PROCEDURE — 3008F BODY MASS INDEX DOCD: CPT | Performed by: FAMILY MEDICINE

## 2024-12-05 PROCEDURE — 23570 CLTX SCAPULAR FX W/O MNPJ: CPT | Performed by: FAMILY MEDICINE

## 2024-12-05 ASSESSMENT — PATIENT HEALTH QUESTIONNAIRE - PHQ9
2. FEELING DOWN, DEPRESSED OR HOPELESS: NOT AT ALL
1. LITTLE INTEREST OR PLEASURE IN DOING THINGS: NOT AT ALL
SUM OF ALL RESPONSES TO PHQ9 QUESTIONS 1 AND 2: 0

## 2024-12-05 NOTE — PROGRESS NOTES
Acute Injury New Patient Visit    CC:   Chief Complaint   Patient presents with    Left Shoulder - Pain     Left shoulder. Ct done at [facility]. Yesterday fell down stairs       HPI: Jonathon is a 64 y.o.male who presents today with new complaints of pain to his back and scapula on the left.  He had slipped and fallen down 13 stairs yesterday was seen evaluated at the emergency department CT scan of the chest was obtained he was found to have comminuted fracture of the inferior scapular body with minimal displacement.  He has very minimal pain he is retired he denies any numbness tingling or burning..        Review of Systems   GENERAL: Negative for malaise, significant weight loss, fever  MUSCULOSKELETAL: See HPI  NEURO: Negative for numbness / tingling     Past Medical History  History reviewed. No pertinent past medical history.    Medication review  Medication Documentation Review Audit       Reviewed by Martita Bray MA (Medical Assistant) on 24 at 0820      Medication Order Taking? Sig Documenting Provider Last Dose Status   ketorolac (Toradol) injection 15 mg [id number]   Felicita Eid PA-C   24 1356                    Allergies  No Known Allergies    Social History  Social History     Socioeconomic History    Marital status:      Spouse name: Not on file    Number of children: Not on file    Years of education: Not on file    Highest education level: Not on file   Occupational History    Not on file   Tobacco Use    Smoking status: Never     Passive exposure: Never    Smokeless tobacco: Never   Vaping Use    Vaping status: Never Used   Substance and Sexual Activity    Alcohol use: Defer    Drug use: Never    Sexual activity: Defer   Other Topics Concern    Not on file   Social History Narrative    Not on file     Social Drivers of Health     Financial Resource Strain: Not on file   Food Insecurity: Not on file   Transportation Needs: Not on file   Physical Activity: Not on file    Stress: Not on file   Social Connections: Not on file   Intimate Partner Violence: Not on file   Housing Stability: Not on file       Surgical History  History reviewed. No pertinent surgical history.    Physical Exam:  GENERAL:  Patient is awake, alert, and oriented to person place and time.  Patient appears well nourished and well kept.  Affect Calm, Not Acutely Distressed.  HEENT:  Normocephalic, Atraumatic, EOMI  CARDIOVASCULAR:  Hemodynamically stable.  RESPIRATORY:  Normal respirations with unlabored breathing.  NEURO: Gross sensation intact to the upper extremities bilaterally.  Extremity: Left shoulder demonstrates limited range of motion with some moderate deficits to forward flexion lateral abduction tenderness palpation at the inferior scapular body with some bruising and hematoma noted.  No midline tenderness about the thoracic spine.  No open cuts or sores seen.  Distal pulses and sensation are intact  strength equal and symmetric      Diagnostics: Previous CT scan reviewed consistent with comminuted fracture of the left inferior scapular body with minimal displacement        Procedure: None  Procedures    Assessment:   Problem List Items Addressed This Visit    None  Visit Diagnoses       Closed displaced fracture of body of left scapula, initial encounter    -  Primary    inferior body    Pain of left scapula                 Plan: Discussed the nonoperative nature of this injury with the patient we will have him continue with his over-the-counter calcium and vitamin D supplements he will participate in light gentle activities as tolerated letting pain be his guide.  We discussed that we will plan to see him back in 6 to 8 weeks for repeat evaluation will obtain 4 views of the left shoulder at follow-up.  We discussed potentially doing a steroid injection to the shoulder with any persistent pain or discomfort.  He does have severe bilateral shoulder osteoarthritis.  He can utilize  over-the-counter anti-inflammatories topical muscle rubs creams and icing.  He will continue with his shoulder range of motion and rehab exercises as tolerated and as pain allows.  No orders of the defined types were placed in this encounter.     At the conclusion of the visit there were no further questions by the patient/family regarding their plan of care.  Patient was instructed to call or return with any issues, questions, or concerns regarding their injury and/or treatment plan described above.     12/05/24 at 8:46 AM - Cole C Budinsky, MD    Office: [phone]    This note was prepared using voice recognition software.  The details of this note are correct and have been reviewed, and corrected to the best of my ability.  Some grammatical errors may persist related to the Dragon software.

## 2025-01-27 ENCOUNTER — APPOINTMENT (OUTPATIENT)
Dept: ORTHOPEDIC SURGERY | Facility: CLINIC | Age: 65
End: 2025-01-27
Payer: COMMERCIAL